# Patient Record
Sex: FEMALE | Race: BLACK OR AFRICAN AMERICAN | NOT HISPANIC OR LATINO | URBAN - METROPOLITAN AREA
[De-identification: names, ages, dates, MRNs, and addresses within clinical notes are randomized per-mention and may not be internally consistent; named-entity substitution may affect disease eponyms.]

---

## 2018-06-04 ENCOUNTER — EMERGENCY (EMERGENCY)
Facility: HOSPITAL | Age: 53
LOS: 1 days | Discharge: HOME | End: 2018-06-04
Admitting: EMERGENCY MEDICINE
Payer: MEDICARE

## 2018-06-04 VITALS
TEMPERATURE: 98 F | RESPIRATION RATE: 18 BRPM | OXYGEN SATURATION: 100 % | SYSTOLIC BLOOD PRESSURE: 164 MMHG | HEART RATE: 85 BPM | DIASTOLIC BLOOD PRESSURE: 96 MMHG

## 2018-06-04 VITALS
WEIGHT: 166.89 LBS | HEART RATE: 75 BPM | RESPIRATION RATE: 18 BRPM | TEMPERATURE: 98 F | OXYGEN SATURATION: 99 % | SYSTOLIC BLOOD PRESSURE: 163 MMHG | DIASTOLIC BLOOD PRESSURE: 75 MMHG

## 2018-06-04 PROCEDURE — 99284 EMERGENCY DEPT VISIT MOD MDM: CPT | Mod: 25

## 2018-06-04 PROCEDURE — 93971 EXTREMITY STUDY: CPT | Mod: 26,RT

## 2018-06-04 PROCEDURE — 93971 EXTREMITY STUDY: CPT

## 2018-06-04 RX ORDER — IBUPROFEN 200 MG
600 TABLET ORAL ONCE
Qty: 0 | Refills: 0 | Status: COMPLETED | OUTPATIENT
Start: 2018-06-04 | End: 2018-06-04

## 2018-06-04 RX ADMIN — Medication 600 MILLIGRAM(S): at 07:56

## 2018-06-04 NOTE — ED PROVIDER NOTE - OBJECTIVE STATEMENT
The pt is a 53 y/o F, who presents to ED c/o "lump" to R leg x while but has been bothering her over past wk. Pt states that has been apply hot compresses and feels like symptoms getting worse, has not taken any pain meds, pain is 4/10, aggravated w/certain positions. Denies injury, fall, numbness or tingling to toes, calf pain or swelling, fevers, chills, cp, sob

## 2018-06-04 NOTE — ED PROVIDER NOTE - MEDICAL DECISION MAKING DETAILS
pt w/"lump" to r knee x while, here stating that it's causing discomfort, us done to eval bakers cyst vs dvt but + lipoma - pt advised to f/u w/ortho or plastics for removal if desires, pt understands and agrees w/plan

## 2018-06-04 NOTE — ED ADULT NURSE NOTE - OBJECTIVE STATEMENT
53 y/o female with hx of DM, HTN, anxiety and depression arrived to [facility] ER reporting right thigh and knee pain accompanied with swelling. Upon assessment, swelling noted to posterior right thigh and knee that is painful to touch, abdomen soft, lung fields WNL, breathing is equal and unlabored, pulses palpable. pt denies chest pain, headache, dizziness, blurred vision, slurred speech, nausea, vomiting, diarrhea, fever, chills, LOC, SOB, weakness, fatigue, recent injury and palpitations. pt verbalized that she first noticed symptoms 2 weeks ago. Care in progress. Awaiting disposition.

## 2018-06-04 NOTE — ED ADULT TRIAGE NOTE - CHIEF COMPLAINT QUOTE
I have a lump on the back side of my right leg that I first noticed 2 weeks ago.  It seems to be getting bigger and bigger and is causing me pain.

## 2018-06-04 NOTE — ED PROVIDER NOTE - MUSCULOSKELETAL, MLM
R LE:  a small palpable ? cyst to popliteal area, no erythema, no warmth, no abscess, FROM, no calf tend, no palpable cords, no rash, soft compartments, + light touch, pedal pulse 2+ R LE:  a small palpable ? cyst vs lipoma to popliteal area, no erythema, no warmth, no abscess, FROM, no calf tend, no palpable cords, no rash, soft compartments, + light touch, pedal pulse 2+; also a 6 cm lipoma to r mid posterior thigh

## 2018-06-08 DIAGNOSIS — Z88.5 ALLERGY STATUS TO NARCOTIC AGENT: ICD-10-CM

## 2018-06-08 DIAGNOSIS — E11.9 TYPE 2 DIABETES MELLITUS WITHOUT COMPLICATIONS: ICD-10-CM

## 2018-06-08 DIAGNOSIS — I10 ESSENTIAL (PRIMARY) HYPERTENSION: ICD-10-CM

## 2018-06-08 DIAGNOSIS — D17.23 BENIGN LIPOMATOUS NEOPLASM OF SKIN AND SUBCUTANEOUS TISSUE OF RIGHT LEG: ICD-10-CM

## 2018-06-08 DIAGNOSIS — M79.661 PAIN IN RIGHT LOWER LEG: ICD-10-CM

## 2018-07-13 ENCOUNTER — EMERGENCY (EMERGENCY)
Facility: HOSPITAL | Age: 53
LOS: 1 days | Discharge: HOME | End: 2018-07-13
Admitting: EMERGENCY MEDICINE
Payer: MEDICARE

## 2018-07-13 VITALS
SYSTOLIC BLOOD PRESSURE: 134 MMHG | TEMPERATURE: 98 F | HEART RATE: 64 BPM | HEIGHT: 65 IN | OXYGEN SATURATION: 98 % | RESPIRATION RATE: 16 BRPM | DIASTOLIC BLOOD PRESSURE: 80 MMHG | WEIGHT: 160.06 LBS

## 2018-07-13 VITALS — WEIGHT: 160.06 LBS | HEIGHT: 65 IN

## 2018-07-13 PROCEDURE — 99283 EMERGENCY DEPT VISIT LOW MDM: CPT | Mod: 25

## 2018-07-13 PROCEDURE — 99282 EMERGENCY DEPT VISIT SF MDM: CPT

## 2018-07-13 RX ORDER — ALBUTEROL 90 UG/1
2 AEROSOL, METERED ORAL
Qty: 0 | Refills: 0 | COMMUNITY

## 2018-07-13 RX ORDER — MELOXICAM 15 MG/1
1 TABLET ORAL
Qty: 0 | Refills: 0 | COMMUNITY

## 2018-07-13 NOTE — ED PROVIDER NOTE - PHYSICAL EXAMINATION
LLE with independent ability to SLR + reflexes intact achilles and patella + compartments soft MS 5/5 Quads/ TA/Hamstrings and EHL + gait steady with cane skin intact pulses 2+ cap refill brisk skin warm and pink + ROM at hip reveals tenderness at extremes ( RLE with known lipoma's x 2 noted as reported at thigh and knee

## 2018-07-13 NOTE — ED ADULT NURSE NOTE - PMH
Anxiety    Asthma    Depression    DM (diabetes mellitus)    Hip pain, chronic, left    HTN (hypertension)

## 2018-07-13 NOTE — ED PROVIDER NOTE - OBJECTIVE STATEMENT
Left hip pain and anterior thigh numbness all week as acute exacerbation of chronic left hip djd pain sans any acute trauma. Reports aching pain and anterior thigh numbness this week and concerned and thus to ED. Patient sees pain management MD as wello as Ortho + told needs Total Hi[p Arthroplasty (ANALI ) though has not proceeded as yet. States last MRI ~ 6 months ago  and believes was both LS spine and hips but unclear + ambulates with cane at baseline > 3 years ( no active bilateral pathology no b/b pathology )

## 2018-07-13 NOTE — ED PROVIDER NOTE - MEDICAL DECISION MAKING DETAILS
anxious female with protracted course at left hip with concerns over intervention + anterior thigh numbness reported is a confounder as to whether lumbar disc pathology as well at play -> discussed follow up with both ortho and pain management MD to review studies completed and patholgies noted and whether or not new study warranted -> given current exam and chronic nature of reported pathology no acute study or intervention warranted

## 2018-07-13 NOTE — ED ADULT NURSE NOTE - OBJECTIVE STATEMENT
Pt states " I have chronic left hip pain. I hurt it in 2013. Tonight it is hurting more than usual".

## 2018-07-13 NOTE — ED ADULT NURSE NOTE - CHPI ED SYMPTOMS NEG
no deformity/no abrasion/no tingling/no bruising/no difficulty bearing weight/no stiffness/no fever/no back pain

## 2018-07-17 DIAGNOSIS — Z88.5 ALLERGY STATUS TO NARCOTIC AGENT: ICD-10-CM

## 2018-07-17 DIAGNOSIS — M25.552 PAIN IN LEFT HIP: ICD-10-CM

## 2018-07-17 DIAGNOSIS — I10 ESSENTIAL (PRIMARY) HYPERTENSION: ICD-10-CM

## 2018-07-17 DIAGNOSIS — R26.2 DIFFICULTY IN WALKING, NOT ELSEWHERE CLASSIFIED: ICD-10-CM

## 2018-07-17 DIAGNOSIS — E11.9 TYPE 2 DIABETES MELLITUS WITHOUT COMPLICATIONS: ICD-10-CM

## 2018-07-17 DIAGNOSIS — J45.909 UNSPECIFIED ASTHMA, UNCOMPLICATED: ICD-10-CM

## 2018-07-17 DIAGNOSIS — Z79.899 OTHER LONG TERM (CURRENT) DRUG THERAPY: ICD-10-CM

## 2019-01-11 ENCOUNTER — EMERGENCY (EMERGENCY)
Facility: HOSPITAL | Age: 54
LOS: 1 days | Discharge: HOME | End: 2019-01-11
Attending: EMERGENCY MEDICINE | Admitting: EMERGENCY MEDICINE
Payer: MEDICARE

## 2019-01-11 VITALS
TEMPERATURE: 98 F | WEIGHT: 154.1 LBS | SYSTOLIC BLOOD PRESSURE: 167 MMHG | OXYGEN SATURATION: 100 % | HEART RATE: 69 BPM | RESPIRATION RATE: 18 BRPM | DIASTOLIC BLOOD PRESSURE: 66 MMHG

## 2019-01-11 DIAGNOSIS — E11.9 TYPE 2 DIABETES MELLITUS WITHOUT COMPLICATIONS: ICD-10-CM

## 2019-01-11 DIAGNOSIS — Z79.899 OTHER LONG TERM (CURRENT) DRUG THERAPY: ICD-10-CM

## 2019-01-11 DIAGNOSIS — J45.909 UNSPECIFIED ASTHMA, UNCOMPLICATED: ICD-10-CM

## 2019-01-11 DIAGNOSIS — I10 ESSENTIAL (PRIMARY) HYPERTENSION: ICD-10-CM

## 2019-01-11 DIAGNOSIS — Z88.5 ALLERGY STATUS TO NARCOTIC AGENT: ICD-10-CM

## 2019-01-11 DIAGNOSIS — R31.0 GROSS HEMATURIA: ICD-10-CM

## 2019-01-11 DIAGNOSIS — N30.01 ACUTE CYSTITIS WITH HEMATURIA: ICD-10-CM

## 2019-01-11 DIAGNOSIS — Z79.1 LONG TERM (CURRENT) USE OF NON-STEROIDAL ANTI-INFLAMMATORIES (NSAID): ICD-10-CM

## 2019-01-11 PROBLEM — F32.9 MAJOR DEPRESSIVE DISORDER, SINGLE EPISODE, UNSPECIFIED: Chronic | Status: ACTIVE | Noted: 2018-07-13

## 2019-01-11 PROBLEM — F41.9 ANXIETY DISORDER, UNSPECIFIED: Chronic | Status: ACTIVE | Noted: 2018-06-04

## 2019-01-11 PROBLEM — M25.552 PAIN IN LEFT HIP: Chronic | Status: ACTIVE | Noted: 2018-07-13

## 2019-01-11 LAB
ALBUMIN SERPL ELPH-MCNC: 4.4 G/DL — SIGNIFICANT CHANGE UP (ref 3.3–5)
ALP SERPL-CCNC: 70 U/L — SIGNIFICANT CHANGE UP (ref 40–120)
ALT FLD-CCNC: 22 U/L — SIGNIFICANT CHANGE UP (ref 10–45)
ANION GAP SERPL CALC-SCNC: 12 MMOL/L — SIGNIFICANT CHANGE UP (ref 5–17)
APPEARANCE UR: ABNORMAL
APTT BLD: 34.6 SEC — SIGNIFICANT CHANGE UP (ref 27.5–36.3)
AST SERPL-CCNC: 17 U/L — SIGNIFICANT CHANGE UP (ref 10–40)
BASOPHILS NFR BLD AUTO: 0.3 % — SIGNIFICANT CHANGE UP (ref 0–2)
BILIRUB SERPL-MCNC: 0.5 MG/DL — SIGNIFICANT CHANGE UP (ref 0.2–1.2)
BILIRUB UR-MCNC: ABNORMAL
BUN SERPL-MCNC: 15 MG/DL — SIGNIFICANT CHANGE UP (ref 7–23)
CALCIUM SERPL-MCNC: 9.5 MG/DL — SIGNIFICANT CHANGE UP (ref 8.4–10.5)
CHLORIDE SERPL-SCNC: 103 MMOL/L — SIGNIFICANT CHANGE UP (ref 96–108)
CO2 SERPL-SCNC: 28 MMOL/L — SIGNIFICANT CHANGE UP (ref 22–31)
COLOR SPEC: ABNORMAL
CREAT SERPL-MCNC: 0.95 MG/DL — SIGNIFICANT CHANGE UP (ref 0.5–1.3)
DIFF PNL FLD: ABNORMAL
EOSINOPHIL NFR BLD AUTO: 3.9 % — SIGNIFICANT CHANGE UP (ref 0–6)
GLUCOSE SERPL-MCNC: 87 MG/DL — SIGNIFICANT CHANGE UP (ref 70–99)
GLUCOSE UR QL: NEGATIVE — SIGNIFICANT CHANGE UP
HCT VFR BLD CALC: 38.7 % — SIGNIFICANT CHANGE UP (ref 34.5–45)
HGB BLD-MCNC: 13 G/DL — SIGNIFICANT CHANGE UP (ref 11.5–15.5)
INR BLD: 1.12 — SIGNIFICANT CHANGE UP (ref 0.88–1.16)
KETONES UR-MCNC: ABNORMAL MG/DL
LEUKOCYTE ESTERASE UR-ACNC: ABNORMAL
LYMPHOCYTES # BLD AUTO: 32.7 % — SIGNIFICANT CHANGE UP (ref 13–44)
MCHC RBC-ENTMCNC: 31 PG — SIGNIFICANT CHANGE UP (ref 27–34)
MCHC RBC-ENTMCNC: 33.6 G/DL — SIGNIFICANT CHANGE UP (ref 32–36)
MCV RBC AUTO: 92.4 FL — SIGNIFICANT CHANGE UP (ref 80–100)
MONOCYTES NFR BLD AUTO: 6.3 % — SIGNIFICANT CHANGE UP (ref 2–14)
NEUTROPHILS NFR BLD AUTO: 56.8 % — SIGNIFICANT CHANGE UP (ref 43–77)
NITRITE UR-MCNC: POSITIVE
PH UR: 6.5 — SIGNIFICANT CHANGE UP (ref 5–8)
PLATELET # BLD AUTO: 301 K/UL — SIGNIFICANT CHANGE UP (ref 150–400)
POTASSIUM SERPL-MCNC: 4 MMOL/L — SIGNIFICANT CHANGE UP (ref 3.5–5.3)
POTASSIUM SERPL-SCNC: 4 MMOL/L — SIGNIFICANT CHANGE UP (ref 3.5–5.3)
PROT SERPL-MCNC: 7.6 G/DL — SIGNIFICANT CHANGE UP (ref 6–8.3)
PROT UR-MCNC: >=300 MG/DL
PROTHROM AB SERPL-ACNC: 12.7 SEC — SIGNIFICANT CHANGE UP (ref 10–12.9)
RBC # BLD: 4.19 M/UL — SIGNIFICANT CHANGE UP (ref 3.8–5.2)
RBC # FLD: 13.2 % — SIGNIFICANT CHANGE UP (ref 10.3–16.9)
SODIUM SERPL-SCNC: 143 MMOL/L — SIGNIFICANT CHANGE UP (ref 135–145)
SP GR SPEC: >=1.03 — SIGNIFICANT CHANGE UP (ref 1–1.03)
UROBILINOGEN FLD QL: 1 E.U./DL — SIGNIFICANT CHANGE UP
WBC # BLD: 7.2 K/UL — SIGNIFICANT CHANGE UP (ref 3.8–10.5)
WBC # FLD AUTO: 7.2 K/UL — SIGNIFICANT CHANGE UP (ref 3.8–10.5)

## 2019-01-11 PROCEDURE — 99284 EMERGENCY DEPT VISIT MOD MDM: CPT

## 2019-01-11 PROCEDURE — 81001 URINALYSIS AUTO W/SCOPE: CPT

## 2019-01-11 PROCEDURE — 85730 THROMBOPLASTIN TIME PARTIAL: CPT

## 2019-01-11 PROCEDURE — 85610 PROTHROMBIN TIME: CPT

## 2019-01-11 PROCEDURE — 80053 COMPREHEN METABOLIC PANEL: CPT

## 2019-01-11 PROCEDURE — 96374 THER/PROPH/DIAG INJ IV PUSH: CPT

## 2019-01-11 PROCEDURE — 85025 COMPLETE CBC W/AUTO DIFF WBC: CPT

## 2019-01-11 PROCEDURE — 99284 EMERGENCY DEPT VISIT MOD MDM: CPT | Mod: 25

## 2019-01-11 PROCEDURE — 36415 COLL VENOUS BLD VENIPUNCTURE: CPT

## 2019-01-11 PROCEDURE — 87086 URINE CULTURE/COLONY COUNT: CPT

## 2019-01-11 RX ORDER — SODIUM CHLORIDE 9 MG/ML
3 INJECTION INTRAMUSCULAR; INTRAVENOUS; SUBCUTANEOUS ONCE
Qty: 0 | Refills: 0 | Status: COMPLETED | OUTPATIENT
Start: 2019-01-11 | End: 2019-01-11

## 2019-01-11 RX ORDER — KETOROLAC TROMETHAMINE 30 MG/ML
30 SYRINGE (ML) INJECTION ONCE
Qty: 0 | Refills: 0 | Status: DISCONTINUED | OUTPATIENT
Start: 2019-01-11 | End: 2019-01-11

## 2019-01-11 RX ORDER — NITROFURANTOIN MACROCRYSTAL 50 MG
1 CAPSULE ORAL
Qty: 14 | Refills: 0 | OUTPATIENT
Start: 2019-01-11 | End: 2019-01-17

## 2019-01-11 RX ORDER — SODIUM CHLORIDE 9 MG/ML
1000 INJECTION INTRAMUSCULAR; INTRAVENOUS; SUBCUTANEOUS ONCE
Qty: 0 | Refills: 0 | Status: COMPLETED | OUTPATIENT
Start: 2019-01-11 | End: 2019-01-11

## 2019-01-11 RX ADMIN — Medication 30 MILLIGRAM(S): at 17:38

## 2019-01-11 RX ADMIN — SODIUM CHLORIDE 1000 MILLILITER(S): 9 INJECTION INTRAMUSCULAR; INTRAVENOUS; SUBCUTANEOUS at 16:25

## 2019-01-11 RX ADMIN — SODIUM CHLORIDE 3 MILLILITER(S): 9 INJECTION INTRAMUSCULAR; INTRAVENOUS; SUBCUTANEOUS at 16:13

## 2019-01-11 NOTE — ED PROVIDER NOTE - OBJECTIVE STATEMENT
The pt is a 54 y/o F, who presents to ED c/o bloody urine since yest. Pt states that she used a deuche and noticed bloody urine later that day. Denies flank pain, n/v/d, fevers, chills, vag bleeding, vag dc, dysuria, urgency

## 2019-01-11 NOTE — ED PROVIDER NOTE - ATTENDING CONTRIBUTION TO CARE
Pt w gross hematuria, no abd pain, f/c, abd pain, flank pain, Well appearing, nad, nc/at, lung cta, heart reg, abd soft, nt, ext no gross deformity, no gross neuro deficits, no cvat, will treat for cystitis, fu w pmd/gyn.

## 2019-01-11 NOTE — ED ADULT NURSE NOTE - NSIMPLEMENTINTERV_GEN_ALL_ED
Implemented All Universal Safety Interventions:  [place] to call system. Call bell, personal items and telephone within reach. Instruct patient to call for assistance. Room bathroom lighting operational. Non-slip footwear when patient is off stretcher. Physically safe environment: no spills, clutter or unnecessary equipment. Stretcher in lowest position, wheels locked, appropriate side rails in place.

## 2019-01-11 NOTE — ED ADULT NURSE NOTE - CHPI ED NUR SYMPTOMS NEG
no coffee grounds emesis/no nausea/no pain/no abdominal pain/no back pain/no fever/no discharge/no vomiting

## 2019-01-11 NOTE — ED PROVIDER NOTE - MEDICAL DECISION MAKING DETAILS
pt w/hematuria x 1 d, no dysuria/no abd or flank pain, normal pelvic exam, labs wnl, will tx w/abx and prn ibuprofen, hydration and f/u w/pmd discussed at length, pt understands and agrees w/plan

## 2019-01-12 RX ORDER — NITROFURANTOIN MACROCRYSTAL 50 MG
1 CAPSULE ORAL
Qty: 14 | Refills: 0 | OUTPATIENT
Start: 2019-01-12 | End: 2019-01-18

## 2019-01-13 LAB
CULTURE RESULTS: NO GROWTH — SIGNIFICANT CHANGE UP
SPECIMEN SOURCE: SIGNIFICANT CHANGE UP

## 2021-07-25 NOTE — ED ADULT TRIAGE NOTE - BP NONINVASIVE DIASTOLIC (MM HG)
[Alert] : alert [Well Nourished] : well nourished [Healthy Appearance] : healthy appearance [Normal Sclera/Conjunctiva] : normal sclera/conjunctiva [EOMI] : extra ocular movement intact [PERRL] : pupils equal, round and reactive to light [No Proptosis] : no proptosis [No Lid Lag] : no lid lag [Normal Outer Ear/Nose] : the ears and nose were normal in appearance [Normal Hearing] : hearing was normal [No Neck Mass] : no neck mass was observed [No LAD] : no lymphadenopathy [Thyroid Not Enlarged] : the thyroid was not enlarged [No Thyroid Nodules] : no palpable thyroid nodules [Clear to Auscultation] : lungs were clear to auscultation bilaterally [No Murmurs] : no murmurs [Normal Rate] : heart rate was normal 75 [Regular Rhythm] : with a regular rhythm [Carotids Normal] : carotid pulses were normal with no bruits [No Edema] : no peripheral edema [Not Tender] : non-tender [Soft] : abdomen soft [No HSM] : no hepato-splenomegaly [Normal Supraclavicular Nodes] : no supraclavicular lymphadenopathy [Normal Anterior Cervical Nodes] : no anterior cervical lymphadenopathy [No CVA Tenderness] : no ~M costovertebral angle tenderness [Normal Gait] : normal gait [No Involuntary Movements] : no involuntary movements were seen [No Joint Swelling] : no joint swelling seen [Normal Strength/Tone] : muscle strength and tone were normal [No Rash] : no rash [No Motor Deficits] : the motor exam was normal [No Tremors] : no tremors [Normal Affect] : the affect was normal [Normal Mood] : the mood was normal [Kyphosis] : no kyphosis present [Acanthosis Nigricans] : no acanthosis nigricans [Foot Ulcers] : no foot ulcers [de-identified] : DP pulses 3+ bilaterally [de-identified] : DTRs 2+ at the ankles with a slightly brisk relaxation phase

## 2022-11-12 ENCOUNTER — EMERGENCY (EMERGENCY)
Facility: HOSPITAL | Age: 57
LOS: 1 days | Discharge: HOME | End: 2022-11-12
Attending: EMERGENCY MEDICINE | Admitting: EMERGENCY MEDICINE
Payer: COMMERCIAL

## 2022-11-12 VITALS
RESPIRATION RATE: 18 BRPM | WEIGHT: 134.92 LBS | HEART RATE: 68 BPM | OXYGEN SATURATION: 99 % | SYSTOLIC BLOOD PRESSURE: 171 MMHG | DIASTOLIC BLOOD PRESSURE: 87 MMHG | TEMPERATURE: 99 F

## 2022-11-12 DIAGNOSIS — Z88.6 ALLERGY STATUS TO ANALGESIC AGENT: ICD-10-CM

## 2022-11-12 DIAGNOSIS — E11.9 TYPE 2 DIABETES MELLITUS WITHOUT COMPLICATIONS: ICD-10-CM

## 2022-11-12 DIAGNOSIS — M54.2 CERVICALGIA: ICD-10-CM

## 2022-11-12 DIAGNOSIS — Z20.2 CONTACT WITH AND (SUSPECTED) EXPOSURE TO INFECTIONS WITH A PREDOMINANTLY SEXUAL MODE OF TRANSMISSION: ICD-10-CM

## 2022-11-12 DIAGNOSIS — N76.0 ACUTE VAGINITIS: ICD-10-CM

## 2022-11-12 DIAGNOSIS — J45.909 UNSPECIFIED ASTHMA, UNCOMPLICATED: ICD-10-CM

## 2022-11-12 DIAGNOSIS — F41.8 OTHER SPECIFIED ANXIETY DISORDERS: ICD-10-CM

## 2022-11-12 DIAGNOSIS — I10 ESSENTIAL (PRIMARY) HYPERTENSION: ICD-10-CM

## 2022-11-12 LAB
APPEARANCE UR: CLEAR — SIGNIFICANT CHANGE UP
BACTERIA # UR AUTO: PRESENT /HPF
BILIRUB UR-MCNC: NEGATIVE — SIGNIFICANT CHANGE UP
COLOR SPEC: YELLOW — SIGNIFICANT CHANGE UP
DIFF PNL FLD: ABNORMAL
EPI CELLS # UR: ABNORMAL /HPF (ref 0–5)
GLUCOSE UR QL: NEGATIVE — SIGNIFICANT CHANGE UP
KETONES UR-MCNC: NEGATIVE — SIGNIFICANT CHANGE UP
LEUKOCYTE ESTERASE UR-ACNC: ABNORMAL
NITRITE UR-MCNC: NEGATIVE — SIGNIFICANT CHANGE UP
PH UR: 5.5 — SIGNIFICANT CHANGE UP (ref 5–8)
PROT UR-MCNC: NEGATIVE MG/DL — SIGNIFICANT CHANGE UP
RBC CASTS # UR COMP ASSIST: < 5 /HPF — SIGNIFICANT CHANGE UP
SP GR SPEC: >=1.03 — SIGNIFICANT CHANGE UP (ref 1–1.03)
UROBILINOGEN FLD QL: 0.2 E.U./DL — SIGNIFICANT CHANGE UP
WBC UR QL: ABNORMAL /HPF

## 2022-11-12 PROCEDURE — 81001 URINALYSIS AUTO W/SCOPE: CPT

## 2022-11-12 PROCEDURE — 87086 URINE CULTURE/COLONY COUNT: CPT

## 2022-11-12 PROCEDURE — 96374 THER/PROPH/DIAG INJ IV PUSH: CPT

## 2022-11-12 PROCEDURE — 87591 N.GONORRHOEAE DNA AMP PROB: CPT

## 2022-11-12 PROCEDURE — 99284 EMERGENCY DEPT VISIT MOD MDM: CPT

## 2022-11-12 PROCEDURE — 87491 CHLMYD TRACH DNA AMP PROBE: CPT

## 2022-11-12 PROCEDURE — 99284 EMERGENCY DEPT VISIT MOD MDM: CPT | Mod: 25

## 2022-11-12 RX ORDER — IBUPROFEN 200 MG
1 TABLET ORAL
Qty: 15 | Refills: 0
Start: 2022-11-12 | End: 2022-11-16

## 2022-11-12 RX ORDER — CYCLOBENZAPRINE HYDROCHLORIDE 10 MG/1
1 TABLET, FILM COATED ORAL
Qty: 30 | Refills: 0
Start: 2022-11-12 | End: 2022-11-21

## 2022-11-12 RX ORDER — ACETAMINOPHEN 500 MG
1000 TABLET ORAL ONCE
Refills: 0 | Status: COMPLETED | OUTPATIENT
Start: 2022-11-12 | End: 2022-11-12

## 2022-11-12 RX ORDER — METRONIDAZOLE 7.5 MG/G
1 GEL VAGINAL
Qty: 7 | Refills: 0
Start: 2022-11-12 | End: 2022-11-18

## 2022-11-12 RX ORDER — LOSARTAN POTASSIUM 100 MG/1
1 TABLET, FILM COATED ORAL
Qty: 0 | Refills: 0 | DISCHARGE

## 2022-11-12 RX ORDER — KETOROLAC TROMETHAMINE 30 MG/ML
15 SYRINGE (ML) INJECTION ONCE
Refills: 0 | Status: DISCONTINUED | OUTPATIENT
Start: 2022-11-12 | End: 2022-11-12

## 2022-11-12 RX ADMIN — Medication 15 MILLIGRAM(S): at 15:50

## 2022-11-12 RX ADMIN — Medication 1000 MILLIGRAM(S): at 15:50

## 2022-11-12 NOTE — ED PROVIDER NOTE - PHYSICAL EXAMINATION
VITAL SIGNS: I have reviewed nursing notes and confirm.  CONSTITUTIONAL: Well-developed; well-nourished; in no acute distress.  SKIN: Agree with RN documentation regarding decubitus evaluation. Remainder of skin exam is warm and dry, no acute rash.  HEAD: Normocephalic; atraumatic.  EYES: PERRL, EOM intact; conjunctiva and sclera clear.  ENT: No nasal discharge; airway clear.  NECK: Supple; non tender.  CARD: S1, S2 normal; no murmurs, gallops, or rubs. Regular rate and rhythm.  RESP: No wheezes, rales or rhonchi.  ABD: Normal bowel sounds; soft; non-distended; non-tender; no hepatosplenomegaly.  EXT: Tender to touch right upper paraspinal. Cervical spine discomfort with stretching of splenius capitatus, greatest point at mid levator scapulae and upper trapezius. Sensations intact.  Dukes and flexion intact discomfort with full extension. Normal ROM. No clubbing, cyanosis or edema.  LYMPH: No acute cervical adenopathy.  NEURO: Alert, oriented. Grossly unremarkable.  PSYCH: Cooperative, appropriate. VITAL SIGNS: I have reviewed nursing notes and confirm.  CONSTITUTIONAL: Well-developed; well-nourished; in no acute distress.  SKIN: Agree with RN documentation regarding decubitus evaluation. Remainder of skin exam is warm and dry, no acute rash.  HEAD: Normocephalic; atraumatic.  EYES: PERRL, EOM intact; conjunctiva and sclera clear.  ENT: No nasal discharge; airway clear.  NECK: , no central spine tenderness , Tender to touch right upper cervical  paraspinal. Cervical spine discomfort with stretching of splenius capitus, greatest point of tenderness at mid splenius and at at mid levator scapulae and upper trapezius. Sensations intact.  extension and flexion intact although discomfort with full extension. slightly limited head turning/ rotation. .   CARD: S1, S2 normal; no murmurs, gallops, or rubs. Regular rate and rhythm.  RESP: No wheezes, rales or rhonchi.  ABD: Normal bowel sounds; soft; non-distended; non-tender; no hepatosplenomegaly.  EXT: No clubbing, cyanosis or edema.  LYMPH: No acute cervical adenopathy.  NEURO: Alert, oriented. Grossly unremarkable.  PSYCH: Cooperative, appropriate.

## 2022-11-12 NOTE — ED PROVIDER NOTE - PATIENT PORTAL LINK FT
You can access the FollowMyHealth Patient Portal offered by [facility] by registering at the following website: [email or web address]. By joining [facility]’s FollowMyHealth portal, you will also be able to view your health information using other applications (apps) compatible with our system.

## 2022-11-12 NOTE — ED PROVIDER NOTE - NS ED ROS FT
56 y/o F with no significant PMHx presents to the ED c/o neck pain and stiffness to right posterior neck beginning 4d ago when she woke up. Pain worsens with movement, pt report stiffness with rotation and pain exacerbated by rotation. Pt denies any numbness, weakness, trauma, or fevers.     Problem 2: Pt c/o 4-5d of vaginal discharge with fish odor. Pt reports no relief with over the counter yeast creams. Pt states its only minimally itchy and that she also had dysuria. Pt denies any frequency, hematuria, or abdominal pain. Pt states she has been in a monogamous relationship and never contracted an STD from her partner, however, reports not trusting him.

## 2022-11-12 NOTE — ED PROVIDER NOTE - NSFOLLOWUPINSTRUCTIONS_ED_ALL_ED_FT
Acute Torticollis, Adult      Torticollis is a condition in which the muscles of the neck tighten (contract) abnormally, causing the neck to twist and the head to move into an unnatural position. Torticollis that develops suddenly is called acute torticollis. People with acute torticollis may have trouble turning their head. The condition can be painful and may range from mild to severe.      What are the causes?    This condition may be caused by:  •Sleeping in an awkward position. This is common.      •Extending or twisting the neck muscles beyond their normal position.      •An injury to the neck muscles.      •An infection.      •A tumor.      •Certain medicines.      •Long-lasting spasms of the neck muscles.      In some cases, the cause may not be known.      What increases the risk?    You are more likely to develop this condition if:  •You have a condition associated with loose ligaments, such as Down syndrome.      •You have a brain condition that affects vision, such as strabismus.        What are the signs or symptoms?    The main symptom of this condition is tilting of the head to one side. Other symptoms include:  •Pain in the neck.      •Trouble turning the head from side to side or up and down.        How is this diagnosed?    This condition may be diagnosed based on:  •A physical exam.      •Your medical history.    •Imaging tests, such as:  •An X-ray.      •An ultrasound.      •A CT scan.      •An MRI.          How is this treated?    Treatment for this condition depends on what is causing the condition. Mild cases may go away without treatment. Treatment for more serious cases may include:  •Medicines or shots to relax the muscles.      •Other medicines, such as antibiotics, to treat the underlying cause.      •Wearing a soft neck collar.      •Physical therapy and stretching exercises to improve movement and strength in your neck.      •Neck massage.      In severe cases, surgery may be needed to repair dislocated or broken bones or to treat nerves in the neck.      Follow these instructions at home:     •Take over-the-counter and prescription medicines only as told by your health care provider.      •Do stretching exercises and massage your neck as told by your health care provider.    •If directed, apply heat to the affected area as often as told by your health care provider. Use the heat source that your health care provider recommends, such as a moist heat pack or a heating pad.  •Place a towel between your skin and the heat source.      •Leave the heat on for 20–30 minutes.      •Remove the heat if your skin turns bright red. This is especially important if you are unable to feel pain, heat, or cold. You have a greater risk of getting burned.        •If you wake up with torticollis after sleeping, check your bed or sleeping area. Look for lumpy pillows or unusual objects. Make sure your bed and sleeping area are comfortable.      •Keep all follow-up visits. This is important.        Contact a health care provider if:    •You have a fever.      •Your symptoms do not improve or they get worse.        Get help right away if:    •You have trouble breathing.      •You make loud, high-pitched sounds when you breathe, most often when you breathe in (stridor).      •You start to drool.      •You have trouble swallowing or pain when swallowing.      •You develop numbness or weakness in your hands or feet.      •You have changes in your speech, understanding, or vision.      •You are in severe pain.      •You cannot move your head or neck.      These symptoms may represent a serious problem that is an emergency. Do not wait to see if the symptoms will go away. Get medical help right away. Call your local emergency services (911 in the [place] Do not drive yourself to the hospital.       Summary    •Torticollis is a condition in which the muscles of the neck tighten (contract) abnormally, causing the neck to twist and the head to move into an unnatural position. Torticollis that develops suddenly is called acute torticollis.      •Treatment for this condition depends on what is causing the condition. Mild cases may go away without treatment.      •Do stretching exercises and massage your neck as told by your health care provider. You may also be instructed to apply heat to the area.      •Contact your health care provider if your symptoms do not improve or they get worse.      This information is not intended to replace advice given to you by your health care provider. Make sure you discuss any questions you have with your health care provider.        Bacterial Vaginosis       Bacterial vaginosis is an infection that occurs when the normal balance of bacteria in the vagina changes. This change is caused by an overgrowth of certain bacteria in the vagina. Bacterial vaginosis is the most common vaginal infection among females aged 15 to 44 years.    This condition increases the risk of sexually transmitted infections (STIs). Treatment can help reduce this risk. Treatment is very important for pregnant women because this condition can cause babies to be born early (prematurely) or at a low birth weight.      What are the causes?    This condition is caused by an increase in harmful bacteria that are normally present in small amounts in the vagina. However, the exact reason this condition develops is not known.    You cannot get bacterial vaginosis from toilet seats, bedding, swimming pools, or contact with objects around you.      What increases the risk?    The following factors may make you more likely to develop this condition:  •Having a new sexual partner or multiple sexual partners, or having unprotected sex.      •Douching.      •Having an intrauterine device (IUD).      •Smoking.      •Abusing drugs and alcohol. This may lead to riskier sexual behavior.      •Taking certain antibiotic medicines.      •Being pregnant.        What are the signs or symptoms?    Some women with this condition have no symptoms. Symptoms may include:  •Gray or white vaginal discharge. The discharge can be watery or foamy.      •A fish-like odor with discharge, especially after sex or during menstruation.      •Itching in and around the vagina.      •Burning or pain with urination.        How is this diagnosed?    This condition is diagnosed based on:  •Your medical history.      •A physical exam of the vagina.      •Checking a sample of vaginal fluid for harmful bacteria or abnormal cells.        How is this treated?    This condition is treated with antibiotic medicines. These may be given as a pill, a vaginal cream, or a medicine that is put into the vagina (suppository). If the condition comes back after treatment, a second round of antibiotics may be needed.      Follow these instructions at home:    Medicines     •Take or apply over-the-counter and prescription medicines only as told by your health care provider.      •Take or apply your antibiotic medicine as told by your health care provider. Do not stop using the antibiotic even if you start to feel better.      General instructions     •If you have a female sexual partner, tell her that you have a vaginal infection. She should follow up with her health care provider. If you have a male sexual partner, he does not need treatment.      •Avoid sexual activity until you finish treatment.      •Drink enough fluid to keep your urine pale yellow.    •Keep the area around your vagina and rectum clean.  •Wash the area daily with warm water.      •Wipe yourself from front to back after using the toilet.        •If you are breastfeeding, talk to your health care provider about continuing breastfeeding during treatment.      •Keep all follow-up visits. This is important.        How is this prevented?    Self-care     • Do not douche.      •Wash the outside of your vagina with warm water only.      •Wear cotton or cotton-lined underwear.      •Avoid wearing tight pants and pantyhose, especially during the summer.      Safe sex   •Use protection when having sex. This includes:  •Using condoms.      •Using dental dams. This is a thin layer of a material made of latex or polyurethane that protects the mouth during oral sex.        •Limit the number of sexual partners. To help prevent bacterial vaginosis, it is best to have sex with just one partner (monogamous relationship).      •Make sure you and your sexual partner are tested for STIs.      Drugs and alcohol     • Do not use any products that contain nicotine or tobacco. These products include cigarettes, chewing tobacco, and vaping devices, such as e-cigarettes. If you need help quitting, ask your health care provider.      • Do not use drugs.    • Do not drink alcohol if:  •Your health care provider tells you not to do this.      •You are pregnant, may be pregnant, or are planning to become pregnant.      •If you drink alcohol:  • Limit how much you have to 0–1 drink a day.       •Be aware of how much alcohol is in your drink. In the [place] one drink equals one 12 oz bottle of beer (355 mL), one 5 oz glass of wine (148 mL), or one 1½ oz glass of hard liquor (44 mL).          Where to find more information    •Centers for Disease Control and Prevention: www.cdc.gov      •American Sexual Health Association (TOMASZ): www.ashastd.org      •[place] Department of Health and Human Services, Office on Women's Health: www.womenshealth.gov        Contact a health care provider if:    •Your symptoms do not improve, even after treatment.      •You have more discharge or pain when urinating.      •You have a fever or chills.      •You have pain in your abdomen or pelvis.      •You have pain during sex.      •You have vaginal bleeding between menstrual periods.        Summary    •Bacterial vaginosis is a vaginal infection that occurs when the normal balance of bacteria in the vagina changes. It results from an overgrowth of certain bacteria.      •This condition increases the risk of sexually transmitted infections (STIs). Getting treated can help reduce this risk.      •Treatment is very important for pregnant women because this condition can cause babies to be born early (prematurely) or at low birth weight.      •This condition is treated with antibiotic medicines. These may be given as a pill, a vaginal cream, or a medicine that is put into the vagina (suppository).      This information is not intended to replace advice given to you by your health care provider. Make sure you discuss any questions you have with your health care provider.      Document Revised: 06/17/2021 Document Reviewed: 06/17/2021    Elsevier Patient Education © 2022 [employer]

## 2022-11-12 NOTE — ED ADULT TRIAGE NOTE - CHIEF COMPLAINT QUOTE
Patient c/o right sided neck pain radiating to top of shoulder that she noticed after waking up 4 days ago. Denies falls / trauma, sob, chest pain, head injury

## 2022-11-12 NOTE — ED PROVIDER NOTE - CLINICAL SUMMARY MEDICAL DECISION MAKING FREE TEXT BOX
Problem 1: 56 y/o F with neck pain consistent with torticollis. No neural deficit's or red flags, signs, or symptoms. Pt advised soft tissue therapy and discharged with recommendation of home therapy with ice, heat, and massage.     Problem 2: 56 y/o F with 5d of discharge, vaginal odor, and dysuria most likely bacterial vaginosis. Plan to give Metrogel, send test for gc chlamydia. Pt uninterested in empiric treatment. Plan to send UA to r/o UTI.  Discharge with Metrogel and ABx, if UA positive advised to take probiotics.

## 2022-11-12 NOTE — ED ADULT NURSE NOTE - NSICDXPASTMEDICALHX_GEN_ALL_CORE_FT
PAST MEDICAL HISTORY:  Anxiety     Asthma     Depression     DM (diabetes mellitus)     Hip pain, chronic, left     HTN (hypertension)

## 2022-11-12 NOTE — ED ADULT NURSE NOTE - OBJECTIVE STATEMENT
patient arrived to ED c/o right sided posterior head pain radiating to right shoulder x 4 days. per patient pain is constant, accompanied by a headache, which is intermittent. denies fever, cough, shortness of breath, nausea or vomiting. denies blurry vision or vision impairment.

## 2022-11-14 LAB
C TRACH RRNA SPEC QL NAA+PROBE: SIGNIFICANT CHANGE UP
CULTURE RESULTS: SIGNIFICANT CHANGE UP
N GONORRHOEA RRNA SPEC QL NAA+PROBE: SIGNIFICANT CHANGE UP
SPECIMEN SOURCE: SIGNIFICANT CHANGE UP
SPECIMEN SOURCE: SIGNIFICANT CHANGE UP

## 2024-01-26 NOTE — ED PROVIDER NOTE - CROS ED ROS STATEMENT
Preventive Care Visit  [facility]TEGR[facility] PRIMARY [facility]  Samy Marcelino MD, Family Medicine  2024    SUBJECTIVE:   Tremaine is a 55 year old, presenting for the following:  Physical and Results (A1C, parathyroid)          2024     2:13 PM   Additional Questions   Roomed by Agueda Campo     Healthy Habits:     Getting at least 3 servings of Calcium per day:  Yes    Bi-annual eye exam:  Yes    Dental care twice a year:  Yes    Sleep apnea or symptoms of sleep apnea:  Sleep apnea    Diet:  Diabetic    Frequency of exercise:  2-3 days/week    Duration of exercise:  45-60 minutes    Taking medications regularly:  Yes    Medication side effects:  None    Additional concerns today:  No    Today's PHQ-9 Score:       2024     2:02 PM   PHQ-9 SCORE   PHQ-9 Total Score MyChart 0   PHQ-9 Total Score 0       Social History     Tobacco Use    Smoking status: Former     Types: Cigars     Quit date: 1989     Years since quittin.0    Smokeless tobacco: Never    Tobacco comments:     quit smoking cigarettes and cigars at ~ age 24 (was light smoker for about 4 years))   Substance Use Topics    Alcohol use: Not Currently     Comment: since ulcer 2024     9:17 AM   Alcohol Use   Prescreen: >3 drinks/day or >7 drinks/week? Not Applicable          No data to display                Last PSA:   PSA   Date Value Ref Range Status   2020 2.35 0 - 4 ug/L Final     Comment:     Assay Method:  Chemiluminescence using Siemens Vista analyzer     Prostate Specific Antigen Screen   Date Value Ref Range Status   2024 1.71 0.00 - 3.50 ng/mL Final       Reviewed orders with patient. Reviewed health maintenance and updated orders accordingly - Yes  Labs reviewed in EPIC  BP Readings from Last 3 Encounters:   24 131/77   22 124/74   22 112/76    Wt Readings from Last 3 Encounters:   24 112 kg (247 lb)   23 108.9 kg (240 lb)   23 108.9 kg  (240 lb)                  Patient Active Problem List   Diagnosis    History of repair of mitral valve    CARDIOVASCULAR SCREENING; LDL GOAL LESS THAN 130    Essential hypertension with goal blood pressure less than 140/90    Idiopathic gout, unspecified chronicity, unspecified site    BMI 36.0-36.9,adult    KOTA (obstructive sleep apnea)    Morbid obesity (H)    Atrial fibrillation, unspecified type (H)    Mild persistent asthma without complication    Family history of diabetes mellitus    Localized edema    Type 2 diabetes mellitus without complication, with long-term current use of insulin (H)     Past Surgical History:   Procedure Laterality Date    COLONOSCOPY N/A 2022    Procedure: COLONOSCOPY, FLEXIBLE, WITH LESION REMOVAL USING SNARE;  Surgeon: Tremaine Bernard MD;  Location:  GI    ESOPHAGOSCOPY, GASTROSCOPY, DUODENOSCOPY (EGD), COMBINED N/A 2022    Procedure: ESOPHAGOGASTRODUODENOSCOPY, WITH BIOPSY;  Surgeon: Tremaine Bernard MD;  Location:  GI    ORTHOPEDIC SURGERY      arthroscopy bilat knee, torn meniscus    REPAIR ATRIAL SEPTAL DEFECT  AGE 5    REPAIR VALVE MITRAL  AGE  AND 2005    annuloplasty ring and MVR in 2005    REPAIR VALVE MITRAL         Social History     Tobacco Use    Smoking status: Former     Types: Cigars     Quit date: 1989     Years since quittin.0    Smokeless tobacco: Never    Tobacco comments:     quit smoking cigarettes and cigars at ~ age 24 (was light smoker for about 4 years))   Substance Use Topics    Alcohol use: Not Currently     Comment: since ulcer 2021     Family History   Problem Relation Age of Onset    Diabetes Mother         onset past age 70         Current Outpatient Medications   Medication Sig Dispense Refill    albuterol (PROAIR HFA/PROVENTIL HFA/VENTOLIN HFA) 108 (90 Base) MCG/ACT inhaler Inhale 2 puffs into the lungs every 6 hours as needed for shortness of breath / dyspnea 8.5 g 0    allopurinol (ZYLOPRIM) 300 MG tablet Take  1 tablet (300 mg) by mouth daily 90 tablet 3    amoxicillin (AMOXIL) 500 MG capsule Please take a total of 2 grams (four 500 mg capsules) 30-60 minutes prior to dental procedure. 12 capsule 3    apixaban ANTICOAGULANT (ELIQUIS) 2.5 MG tablet Take 1 tablet (2.5 mg) by mouth 2 times daily 180 tablet 1    budesonide-formoterol (SYMBICORT) 80-4.5 MCG/ACT Inhaler Inhale 2 puffs into the lungs 2 times daily 6.9 g 11    Continuous Blood Gluc Sensor (FREESTYLE MINGO 3 SENSOR) MISC use 1 each every 14 days 6 each 3    diltiazem ER (DILT-XR) 180 MG 24 hr capsule Take 1 capsule (180 mg) by mouth daily 90 capsule 3    furosemide (LASIX) 20 MG tablet Take 1 tablet (20 mg) by mouth daily 90 tablet 3    Insulin Glargine-yfgn (SEMGLEE, YFGN,) 100 UNIT/ML SOPN Inject 16 Units Subcutaneous at bedtime May increase up to 16 units daily as indicated by numbers 15 mL 0    insulin pen needle (BD PEN NEEDLE ZORAIDA 2ND GEN) 32G X 4 MM miscellaneous Use 1 pen needles daily as directed. 100 each 3    metFORMIN (GLUCOPHAGE) 500 MG tablet Take 1 tablet (500 mg) by mouth 2 times daily (with meals) 180 tablet 3    metoprolol succinate ER (TOPROL XL) 100 MG 24 hr tablet Take 1 tablet (100 mg) by mouth daily 90 tablet 3    Multiple Vitamins-Minerals (MENS 50+ MULTI VITAMIN/MIN PO) Take 1 tablet by mouth daily      order for DME Equipment being ordered: CPAP  Patient Tremaine King was set up at [facility] on September 14, 2015. Patient received a [employer] DreamStation Auto CPAP Auto. Pressures were set at Auto 12 - 12 cm H2O.   Patient s ramp is 9 cm H2O for Auto and FLEX/EPR is A Flex.  Patient received a [employer] Mask name: BLUE GEL NASAL  Nasal mask Size Medium, heated tubing and heated humidifier.  Patient is not enrolled in the STM Program and does not need to meet compliance. Samina Arora      sildenafil (REVATIO) 20 MG tablet Take 1 tablet (20 mg) by mouth daily as needed (erectile dysfunction) 12 tablet 1     Allergies    Allergen Reactions    Hctz [Hydrochlorothiazide]      Hypokalemia      Spironolactone      hyponatremia     Recent Labs   Lab Test 01/26/24  1608 11/27/23  0807 04/18/23  0930 04/15/23  1056 12/05/22  1059 02/22/22  0852 11/19/21  1500 11/12/21  0633 10/06/21  0907 11/09/20  1131 07/01/20  0806   A1C 6.8* 7.4*  --  6.8*   < >  --   --   --    < >  --   --    *  --  90  --   --  100 104*  --    < > 114*  --    HDL 38*  --  44  --   --  45 49  --    < > 51  --    TRIG 149  --  101  --   --  130 72  --    < > 180*  --    ALT  --   --   --   --   --  41 101* 91*   < > 166*  --    CR  --  0.95 1.16  --    < > 1.07 1.02 1.08   < > 1.15 1.26*   GFRESTIMATED  --  >90 75  --    < > 83 84 78   < > 73 65   GFRESTBLACK  --   --   --   --   --   --   --   --   --  84 76   POTASSIUM  --  3.9 4.3  --    < > 4.0 3.7 3.7   < > 4.3 4.2   TSH 1.69  --  1.51  --   --   --   --   --   --   --   --     < > = values in this interval not displayed.        Reviewed and updated as needed this visit by clinical staff   Tobacco  Allergies  Meds              Reviewed and updated as needed this visit by Provider     Meds                Review of Systems   Constitutional:  Negative for chills and fever.   HENT:  Negative for congestion, ear pain, hearing loss and sore throat.    Eyes:  Negative for pain and visual disturbance.   Respiratory:  Negative for cough and shortness of breath.    Cardiovascular:  Negative for chest pain and palpitations.   Gastrointestinal:  Negative for abdominal pain, constipation, diarrhea and nausea.   Genitourinary:  Negative for dysuria, frequency, genital sores, hematuria, penile discharge and urgency.   Musculoskeletal:  Negative for arthralgias, joint swelling and myalgias.   Skin:  Negative for rash.   Neurological:  Positive for numbness. Negative for dizziness, weakness and headaches.        Intermittent numbness to toes bilaterally   Psychiatric/Behavioral:  The patient is not nervous/anxious.   "    Diabetes:  BG have been generally below 170 per CGM over the past month since re-starting exercise. No low blood sugars. Denies any numbness or tingling in his toes (but sometimes they feel \"asleep\"). No sores or infections. Eye exam annually without retinopathy.Currently doing an 8 week challenge at [facility]-goes 3 times weekly  Notes BG spikes after meals or while at the gym, then drops after  Taking 12 units glargine daily along with 500 mg metformin BID  Discussed possibility of adding GLP1 to help manage diabetes and obesity-patient agreed to discuss further with PharmD at next visit    Asthma-Unable to get Qvar inhaler. Has rarely needed albuterol (triggers with exercise and cold).     HTN, A-fib, and hx of mitral valve repair: Recent echo stable with normal EF. Denies any chest pain, dizziness, lightheadedness, or shortness of breath with exertion. Tolerating exercise well, but does feel it is difficult to raise his heart rate    OBJECTIVE:   /77   Pulse (!) 45   Temp 97.8  F (36.6  C) (Temporal)   Resp 15   Ht 1.811 m (5' 11.3\")   Wt 112 kg (247 lb)   SpO2 98%   BMI 34.16 kg/m     Estimated body mass index is 34.16 kg/m  as calculated from the following:    Height as of this encounter: 1.811 m (5' 11.3\").    Weight as of this encounter: 112 kg (247 lb).  Physical Exam  GENERAL: alert and no distress  EYES: PERRL, EOMI, and slight outward strabismus noted in L eye with accomodation  HENT: ear canals and TM's normal, nose and mouth without ulcers or lesions  NECK: no adenopathy, no asymmetry, masses, or scars  RESP: lungs clear to auscultation - no rales, rhonchi or wheezes  CV: Bradycardic, murmur present per baseline  ABDOMEN: soft, nontender, no hepatosplenomegaly, no masses and bowel sounds normal  MS: no gross musculoskeletal defects noted, no edema  SKIN: no suspicious lesions or rashes  NEURO: Normal strength and tone, mentation intact and speech normal   PSYCH: mentation appears " normal, affect normal/bright  LYMPH: no cervical, supraclavicular adenopathy  Diabetic foot exam: Normal pedal pulses bilaterally. Normal sensation throughout feet bilaterally to monofilament exam. Normal nail thickness. No sores present.  Labs reviewed in Epic  d  1. Encounter for preventative adult health care exam with abnormal findings  - PSA, screen; Future  - PSA, screen    2. Essential hypertension with goal blood pressure less than 140/90  Will decreased metoprolol dose to 100 mg due to bradycardia with recent echo with normal EF. Patient is asymptomatic with normal BP.  - metoprolol succinate ER (TOPROL XL) 100 MG 24 hr tablet; Take 1 tablet (100 mg) by mouth daily  Dispense: 90 tablet; Refill: 3    3. History of repair of mitral valve  Patient follows closely with cardiology. Recent stable echo.    4. Atrial fibrillation, unspecified type (H)  Stable, continues on apixaban. Followed with cardiology.  EYO1ML2-LBTp Score: 2 points, which represents a 2.2% annual risk of major embolic event, without anti-coagulation or an LAAO device.      5. Type 2 diabetes mellitus without complication, with long-term current use of insulin (H)  Stable. Will consult with PharmD for possible addition of GLP1  - Med Therapy Management Referral    6. Mild persistent asthma without complication  Well-controlled.Updated asthma plan.WiIl switch to SMART therapy as Qvar now unavaialble.  - budesonide-formoterol (SYMBICORT) 80-4.5 MCG/ACT Inhaler; Inhale 2 puffs into the lungs 2 times daily  Dispense: 6.9 g; Refill: 11    7. Serum calcium elevated  - Calcium; Future  - Parathyroid Hormone Intact; Future  - TSH with free T4 reflex; Future  - Vitamin D Deficiency; Future  - Calcium  - Parathyroid Hormone Intact  - TSH with free T4 reflex  - Vitamin D Deficiency    8. Need for vaccination    - ZOSTER RECOMBINANT ADJUVANTED (SHINGRIX)  - COVID-19 12+ (2023-24) (PFIZER)    9. Idiopathic gout, unspecified chronicity, unspecified  "site  Refills provided.  - allopurinol (ZYLOPRIM) 300 MG tablet; Take 1 tablet (300 mg) by mouth daily  Dispense: 90 tablet; Refill: 3    10. Type 2 diabetes mellitus with hyperglycemia, with long-term current use of insulin (H)    - Insulin Glargine-yfgn (SEMGLEE, YFGN,) 100 UNIT/ML SOPN; Inject 16 Units Subcutaneous at bedtime May increase up to 16 units daily as indicated by numbers  Dispense: 15 mL; Refill: 0    11. Type 2 diabetes mellitus without complication, without long-term current use of insulin (H)  - metFORMIN (GLUCOPHAGE) 500 MG tablet; Take 1 tablet (500 mg) by mouth 2 times daily (with meals)  Dispense: 180 tablet; Refill: 3    12. Type 2 diabetes mellitus with hyperglycemia, without long-term current use of insulin (H)    - Hemoglobin A1c    13. Dyslipidemia    - Lipid Profile    Counseling       Regular exercise       Healthy diet/nutrition       Alcohol Use       BMI  Estimated body mass index is 34.16 kg/m  as calculated from the following:    Height as of this encounter: 1.811 m (5' 11.3\").    Weight as of this encounter: 112 kg (247 lb).   Weight management plan: Discussed healthy diet and exercise guidelines Patient willing to meet with PharmD to discuss possibility of GLP1 medication for his diabetes and obesity.      He reports that he quit smoking about 35 years ago. His smoking use included cigars. He has never used smokeless tobacco.    Patient Instructions   Will change metoprolol dose from 150 mg to 100 mg daily due to low heart rate. Alert clinic for any palpitations or elevated heart rate, chest pain, or activity intolerance.    Continue insulin and metformin without changes    Will check labs again today for elevated parathyroid hormone, calcium, thyroid, and vitamin D, and prostate cancer screening    Vaccines for shingles and covid given today    Follow-up in 6 months    Note by:Gladis Mchugh NP student    Signed Electronically by: Samy Marcelino MD  Answers submitted by the " patient for this visit:  Patient Health Questionnaire (Submitted on 1/26/2024)  PHQ9 TOTAL SCORE: 0  Annual Preventive Visit (Submitted on 1/19/2024)  Chief Complaint: Annual Exam:  Blood in stool: No  heartburn: No  peripheral edema: No  mood changes: No  Skin sensation changes: No  impotence: No     all other ROS negative except as per HPI

## 2024-05-31 ENCOUNTER — EMERGENCY (EMERGENCY)
Facility: HOSPITAL | Age: 59
LOS: 1 days | Discharge: HOME | End: 2024-05-31
Attending: EMERGENCY MEDICINE | Admitting: EMERGENCY MEDICINE
Payer: MEDICARE

## 2024-05-31 VITALS
TEMPERATURE: 98 F | SYSTOLIC BLOOD PRESSURE: 180 MMHG | HEIGHT: 63 IN | WEIGHT: 177.03 LBS | RESPIRATION RATE: 17 BRPM | HEART RATE: 77 BPM | DIASTOLIC BLOOD PRESSURE: 83 MMHG | OXYGEN SATURATION: 98 %

## 2024-05-31 DIAGNOSIS — N89.8 OTHER SPECIFIED NONINFLAMMATORY DISORDERS OF VAGINA: ICD-10-CM

## 2024-05-31 DIAGNOSIS — I10 ESSENTIAL (PRIMARY) HYPERTENSION: ICD-10-CM

## 2024-05-31 DIAGNOSIS — Z88.5 ALLERGY STATUS TO NARCOTIC AGENT: ICD-10-CM

## 2024-05-31 PROCEDURE — 99283 EMERGENCY DEPT VISIT LOW MDM: CPT

## 2024-05-31 PROCEDURE — 87800 DETECT AGNT MULT DNA DIREC: CPT

## 2024-05-31 RX ORDER — CLONAZEPAM 1 MG
1 TABLET ORAL
Qty: 0 | Refills: 0 | DISCHARGE

## 2024-05-31 RX ORDER — CLOMIPRAMINE HYDROCHLORIDE 50 MG/1
1 CAPSULE ORAL
Qty: 0 | Refills: 0 | DISCHARGE

## 2024-05-31 NOTE — ED ADULT TRIAGE NOTE - RESPIRATORY RATE (BREATHS/MIN)
flonase nasal spray 2 spays each nostril for nasal congestion, post nasal drip, runny nose    Delsym as needed for cough    Allegra or zyrtec for allergies, post nasal drip, runny nose, watery eyes.    cepacol throat lozenges and/or chloraseptic spray for sore throat    mucinex for chest congestion.     Tylenol or ibuprofen for pain or fever.       17

## 2024-05-31 NOTE — ED PROVIDER NOTE - PATIENT PORTAL LINK FT
You can access the FollowMyHealth Patient Portal offered by [facility] by registering at the following website: [email or web address]. By joining [facility]’s FollowMyHealth portal, you will also be able to view your health information using other applications (apps) compatible with our system.

## 2024-05-31 NOTE — ED PROVIDER NOTE - PHYSICAL EXAMINATION
DEREJE rashid chaperone: speculum. pt points to area just external to labia as location of itching. No crepitus, firmness, induration, fluctuance. Skin is normal temp. No erythema/warmth. No obvious skin breaks.   no ulcers/sores. No discharge. No foul odor.   normal exam    no LE edema, normal equal distal pulses, steady unassisted gait.

## 2024-05-31 NOTE — ED ADULT NURSE NOTE - OBJECTIVE STATEMENT
Pt has been having ongoing burning & odor in her vaginal area for a year. Pt has seen several doctors for it, but has not been helped by any prescribed or OTC medications. Pt current pain level from the burning is 8. Pt denies any other symptoms. Burning is not associated with urination. Pt has no discharge and instead says she has a lot of dryness.

## 2024-05-31 NOTE — ED PROVIDER NOTE - NSFOLLOWUPINSTRUCTIONS_ED_ALL_ED_FT
It is unclear what exactly is causing your symptoms! It is important to continue following up with your doctor outside the hospital and to return to ER for: Persistent fever/vomiting, uncontrolled pain, worsening swelling, worsening breathing, worsening lightheaded, spreading redness.    Follow up with your GYN as soon as possible.  Can also call [facility] at [phone].  OR can follow up at [facility]: [phone].  [address]     Can take tylenol 650mg every 6hrs as needed for pain.    Stay well hydrated.    Continue to take medications as prescribed. Your doctor may need to make medication changes if your blood pressure continues to be high.

## 2024-05-31 NOTE — ED ADULT NURSE NOTE - CHIEF COMPLAINT QUOTE
Drug  Lidocaine Viscous HCl 2% solution  Form  [place] [employer] Electronic PA Form (2017 NCPDP)    Key: [id number]  
PA denied  
Pt c/o vaginal burning, odor x 2 weeks. Prescribed Metronidazole cream with minimal relief. PMH HTN, taking medication in triage. Denies HA, cp.

## 2024-05-31 NOTE — ED ADULT TRIAGE NOTE - CHIEF COMPLAINT QUOTE
Pt c/o vaginal burning, odor x 2 weeks. Prescribed Metronidazole cream with minimal relief. PMH HTN, taking medication in triage. Denies HA, cp.

## 2024-05-31 NOTE — ED ADULT NURSE NOTE - NSFALLUNIVINTERV_ED_ALL_ED
Bed/Stretcher in lowest position, wheels locked, appropriate side rails in place/Call bell, personal items and telephone in reach/Instruct patient to call for assistance before getting out of bed/chair/stretcher/Non-slip footwear applied when patient is off stretcher/[place] to call system/Physically safe environment - no spills, clutter or unnecessary equipment/Purposeful proactive rounding/Room/bathroom lighting operational, light cord in reach

## 2024-05-31 NOTE — ED PROVIDER NOTE - OBJECTIVE STATEMENT
58F PMH HTN p/w vaginal odor and burning/itching for the last 2 weeks. Has intermittent similar symptoms for several years and states that she has been to other ER's and GYN and no one is able to figure out why it happens. Has tried metronidazole cream, monistat, phd vaginal moisturizing gel as well as other OTC medications w/o any improvement. Has GYN appt w/i 1-2 weeks. Came to ED today because she just wants her symptoms to go away. No other systemic symptoms. Has been tested for BV/STDs, all negative.   Denies f/c, vaginal discharge, dysuria/frequency, SOB/CP, abd pain/bloating, NVD, rashes.   Menopause 13yrs ago, last sex >1yr ago. No hx STDs.

## 2024-05-31 NOTE — ED ADULT TRIAGE NOTE - GLASGOW COMA SCALE: SCORE, MLM
----- Message from Jerad Veronica sent at 9/7/2022  2:47 PM CDT -----  Type:  Sooner Appointment Request    Caller is requesting a sooner appointment.  Caller declined first available appointment listed below.  Caller will not accept being placed on the waitlist and is requesting a message be sent to doctor.    Name of Caller:  patient  When is the first available appointment?  11/22  Symptoms:  holger webb  Best Call Back Number:  [phone]   Additional Information:       
Patient have an appointment with Dr. Chan 9/9/22  
15

## 2024-05-31 NOTE — ED PROVIDER NOTE - CLINICAL SUMMARY MEDICAL DECISION MAKING FREE TEXT BOX
58F PMH HTN p/w vaginal odor and burning/itching for the last 2 weeks. Has intermittent similar symptoms for several years and states that she has been to other ER's and GYN and no one is able to figure out why it happens. Has tried metronidazole cream, monistat, phd vaginal moisturizing gel as well as other OTC medications w/o any improvement. Has GYN appt w/i 1-2 weeks. Came to ED today because she just wants her symptoms to go away. No other systemic symptoms. 58F PMH HTN p/w vaginal odor and burning/itching for the last 2 weeks. Has intermittent similar symptoms for several years and states that she has been to other ER's and GYN and no one is able to figure out why it happens. Has tried metronidazole cream, monistat, phd vaginal moisturizing gel as well as other OTC medications w/o any improvement. Has GYN appt w/i 1-2 weeks. Came to ED today because she just wants her symptoms to go away. No other systemic symptoms.  Hypertensive, other vitals wnl. Exam as above.   ddx: Unclear etiology. Normal exam. Low suspicion BV. Possibly menopausal/hormonal related. clinically no acute significant pathology.   Asymptomatic HTN.   BV swab sent off. Would hold empiric tx.   Discussed importance of outpt follow up and return precautions. Clinically no indication for further emergent ED workup or hospitalization at this time. Stable for dc, outpt f/u.

## 2024-05-31 NOTE — ED PROVIDER NOTE - IV ALTEPLASE INCLUSION HIDDEN
Case Management Assessment  Initial Evaluation    Date/Time of Evaluation: 8/16/2023 11:28 AM  Assessment Completed by: Clark Peralta    If patient is discharged prior to next notation, then this note serves as note for discharge by case management. Patient Name: Evelia Sloan                   YOB: 1973  Diagnosis: Diverticulitis [K57.92]  Diverticulitis of colon [K57.32]  Lower abdominal pain [R10.30]  Adverse effect of drug, initial encounter Sterling Aguirre                   Date / Time: 8/15/2023  2:48 PM    Patient Admission Status: Inpatient   Readmission Risk (Low < 19, Mod (19-27), High > 27): Readmission Risk Score: 11.5    Current PCP: Becca Madison MD  PCP verified by CM? Yes    Chart Reviewed: Yes      History Provided by: Spouse  Patient Orientation: Alert and Oriented, Person, Other (see comment) (PT IS Monegasque SPEAKING AND WAS ON VENT THIS AM.INFORMATION FROM WIFE Breanne Singh)    Patient Cognition: Short Term Memory Deficit (INFORMATION FROM WIFE DAYANA)    Hospitalization in the last 30 days (Readmission):  No    If yes, Readmission Assessment in CM Navigator will be completed. Advance Directives:      Code Status: Full Code   Patient's Primary Decision Maker is: Legal Next of Kin    Primary Decision Maker: Dayana Robison - Spouse - [phone]    Discharge Planning:    Patient lives with:   Type of Home:    Primary Care Giver: Spouse  Patient Support Systems include: Spouse/Significant Other, Children, Family Members   Current Financial resources:    Current community resources:    Current services prior to admission:              Current DME:  CPAP            Type of Home Care services:       ADLS  Prior functional level: Independent in ADLs/IADLs, Other (see comment) (WIFE DOES ASSIST PT WHEN NEEDED AND IS ALWAYS WITH HIM SHE STATES)  Current functional level:  Other (see comment) (PT ON VENT THIS AM AND MAY NEED PT/OT TO EVAL)    PT AM-PAC:   /24  OT AM-PAC:
show

## 2024-06-01 LAB
CANDIDA AB TITR SER: SIGNIFICANT CHANGE UP
G VAGINALIS DNA SPEC QL NAA+PROBE: SIGNIFICANT CHANGE UP
T VAGINALIS SPEC QL WET PREP: SIGNIFICANT CHANGE UP

## 2024-11-25 NOTE — ED ADULT TRIAGE NOTE - NS ED NURSE BANDS TYPE
Additional Notes: after treating area with LN2, a dilated pore was highlighted while lesion was frozen. discussed if lesion persists, it could be extracted or shave removed. pt understands. Detail Level: Simple Render Risk Assessment In Note?: no Name band;

## 2025-04-04 ENCOUNTER — EMERGENCY (EMERGENCY)
Facility: HOSPITAL | Age: 60
LOS: 1 days | Discharge: HOME | End: 2025-04-04
Attending: EMERGENCY MEDICINE | Admitting: EMERGENCY MEDICINE
Payer: MEDICARE

## 2025-04-04 VITALS
HEART RATE: 76 BPM | RESPIRATION RATE: 18 BRPM | OXYGEN SATURATION: 98 % | HEIGHT: 65 IN | SYSTOLIC BLOOD PRESSURE: 162 MMHG | DIASTOLIC BLOOD PRESSURE: 85 MMHG | TEMPERATURE: 98 F | WEIGHT: 164.91 LBS

## 2025-04-04 VITALS
OXYGEN SATURATION: 99 % | SYSTOLIC BLOOD PRESSURE: 178 MMHG | DIASTOLIC BLOOD PRESSURE: 82 MMHG | TEMPERATURE: 98 F | HEART RATE: 72 BPM | RESPIRATION RATE: 18 BRPM

## 2025-04-04 LAB
ALBUMIN SERPL ELPH-MCNC: 4.2 G/DL — SIGNIFICANT CHANGE UP (ref 3.3–5)
ALP SERPL-CCNC: 84 U/L — SIGNIFICANT CHANGE UP (ref 40–120)
ALT FLD-CCNC: 24 U/L — SIGNIFICANT CHANGE UP (ref 10–45)
ANION GAP SERPL CALC-SCNC: 12 MMOL/L — SIGNIFICANT CHANGE UP (ref 5–17)
APPEARANCE UR: CLEAR — SIGNIFICANT CHANGE UP
AST SERPL-CCNC: 17 U/L — SIGNIFICANT CHANGE UP (ref 10–40)
BILIRUB DIRECT SERPL-MCNC: 0.1 MG/DL — SIGNIFICANT CHANGE UP (ref 0–0.3)
BILIRUB INDIRECT FLD-MCNC: 0.3 MG/DL — SIGNIFICANT CHANGE UP (ref 0.2–1)
BILIRUB SERPL-MCNC: 0.4 MG/DL — SIGNIFICANT CHANGE UP (ref 0.2–1.2)
BILIRUB UR-MCNC: NEGATIVE — SIGNIFICANT CHANGE UP
BUN SERPL-MCNC: 23 MG/DL — SIGNIFICANT CHANGE UP (ref 7–23)
CALCIUM SERPL-MCNC: 9.3 MG/DL — SIGNIFICANT CHANGE UP (ref 8.4–10.5)
CHLORIDE SERPL-SCNC: 102 MMOL/L — SIGNIFICANT CHANGE UP (ref 96–108)
CO2 SERPL-SCNC: 25 MMOL/L — SIGNIFICANT CHANGE UP (ref 22–31)
COLOR SPEC: YELLOW — SIGNIFICANT CHANGE UP
CREAT SERPL-MCNC: 0.86 MG/DL — SIGNIFICANT CHANGE UP (ref 0.5–1.3)
DIFF PNL FLD: ABNORMAL
EGFR: 78 ML/MIN/1.73M2 — SIGNIFICANT CHANGE UP
EGFR: 78 ML/MIN/1.73M2 — SIGNIFICANT CHANGE UP
GLUCOSE SERPL-MCNC: 97 MG/DL — SIGNIFICANT CHANGE UP (ref 70–99)
GLUCOSE UR QL: NEGATIVE MG/DL — SIGNIFICANT CHANGE UP
HCT VFR BLD CALC: 39.5 % — SIGNIFICANT CHANGE UP (ref 34.5–45)
HGB BLD-MCNC: 13.6 G/DL — SIGNIFICANT CHANGE UP (ref 11.5–15.5)
KETONES UR-MCNC: NEGATIVE MG/DL — SIGNIFICANT CHANGE UP
LEUKOCYTE ESTERASE UR-ACNC: NEGATIVE — SIGNIFICANT CHANGE UP
MCHC RBC-ENTMCNC: 31.6 PG — SIGNIFICANT CHANGE UP (ref 27–34)
MCHC RBC-ENTMCNC: 34.4 G/DL — SIGNIFICANT CHANGE UP (ref 32–36)
MCV RBC AUTO: 91.9 FL — SIGNIFICANT CHANGE UP (ref 80–100)
NITRITE UR-MCNC: NEGATIVE — SIGNIFICANT CHANGE UP
NRBC BLD AUTO-RTO: 0 /100 WBCS — SIGNIFICANT CHANGE UP (ref 0–0)
PH UR: 5 — SIGNIFICANT CHANGE UP (ref 5–8)
PLATELET # BLD AUTO: 301 K/UL — SIGNIFICANT CHANGE UP (ref 150–400)
POTASSIUM SERPL-MCNC: 3.5 MMOL/L — SIGNIFICANT CHANGE UP (ref 3.5–5.3)
POTASSIUM SERPL-SCNC: 3.5 MMOL/L — SIGNIFICANT CHANGE UP (ref 3.5–5.3)
PROT SERPL-MCNC: 7.4 G/DL — SIGNIFICANT CHANGE UP (ref 6–8.3)
PROT UR-MCNC: NEGATIVE MG/DL — SIGNIFICANT CHANGE UP
RBC # BLD: 4.3 M/UL — SIGNIFICANT CHANGE UP (ref 3.8–5.2)
RBC # FLD: 13.4 % — SIGNIFICANT CHANGE UP (ref 10.3–14.5)
SODIUM SERPL-SCNC: 139 MMOL/L — SIGNIFICANT CHANGE UP (ref 135–145)
SP GR SPEC: 1.03 — SIGNIFICANT CHANGE UP (ref 1–1.03)
UROBILINOGEN FLD QL: 0.2 MG/DL — SIGNIFICANT CHANGE UP (ref 0.2–1)
WBC # BLD: 7.75 K/UL — SIGNIFICANT CHANGE UP (ref 3.8–10.5)
WBC # FLD AUTO: 7.75 K/UL — SIGNIFICANT CHANGE UP (ref 3.8–10.5)

## 2025-04-04 PROCEDURE — 76856 US EXAM PELVIC COMPLETE: CPT | Mod: 26

## 2025-04-04 PROCEDURE — 81513 NFCT DS BV RNA VAG FLU ALG: CPT

## 2025-04-04 PROCEDURE — 87481 CANDIDA DNA AMP PROBE: CPT

## 2025-04-04 PROCEDURE — 99284 EMERGENCY DEPT VISIT MOD MDM: CPT | Mod: 25

## 2025-04-04 PROCEDURE — 36415 COLL VENOUS BLD VENIPUNCTURE: CPT

## 2025-04-04 PROCEDURE — 76830 TRANSVAGINAL US NON-OB: CPT

## 2025-04-04 PROCEDURE — 85027 COMPLETE CBC AUTOMATED: CPT

## 2025-04-04 PROCEDURE — 76830 TRANSVAGINAL US NON-OB: CPT | Mod: 26

## 2025-04-04 PROCEDURE — 76856 US EXAM PELVIC COMPLETE: CPT

## 2025-04-04 PROCEDURE — 87661 TRICHOMONAS VAGINALIS AMPLIF: CPT

## 2025-04-04 PROCEDURE — 80048 BASIC METABOLIC PNL TOTAL CA: CPT

## 2025-04-04 PROCEDURE — 87491 CHLMYD TRACH DNA AMP PROBE: CPT

## 2025-04-04 PROCEDURE — 81001 URINALYSIS AUTO W/SCOPE: CPT

## 2025-04-04 PROCEDURE — 80076 HEPATIC FUNCTION PANEL: CPT

## 2025-04-04 PROCEDURE — 87591 N.GONORRHOEAE DNA AMP PROB: CPT

## 2025-04-04 PROCEDURE — 99285 EMERGENCY DEPT VISIT HI MDM: CPT

## 2025-04-04 RX ORDER — OMEPRAZOLE 20 MG/1
1 CAPSULE, DELAYED RELEASE ORAL
Qty: 30 | Refills: 0
Start: 2025-04-04

## 2025-04-04 RX ORDER — MAGNESIUM, ALUMINUM HYDROXIDE 200-200 MG
30 TABLET,CHEWABLE ORAL ONCE
Refills: 0 | Status: COMPLETED | OUTPATIENT
Start: 2025-04-04 | End: 2025-04-04

## 2025-04-04 RX ORDER — LIDOCAINE HYDROCHLORIDE 20 MG/ML
10 JELLY TOPICAL ONCE
Refills: 0 | Status: COMPLETED | OUTPATIENT
Start: 2025-04-04 | End: 2025-04-04

## 2025-04-04 RX ADMIN — Medication 30 MILLILITER(S): at 17:24

## 2025-04-04 RX ADMIN — LIDOCAINE HYDROCHLORIDE 10 MILLILITER(S): 20 JELLY TOPICAL at 17:24

## 2025-04-04 RX ADMIN — Medication 40 MILLIGRAM(S): at 16:40
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
## 2025-04-04 NOTE — ED ADULT NURSE NOTE - ISOLATION TYPE:
Requested Prescriptions     Pending Prescriptions Disp Refills    HYDROcodone-acetaminophen (NORCO) 5-325 mg per tablet 120 Tab 0     Sig: Take 1 Tab by mouth every six (6) hours as needed for Pain. Max Daily Amount: 4 Tabs. None
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
## 2025-04-04 NOTE — ED PROVIDER NOTE - PROGRESS NOTE DETAILS
Labs wnl, gi sx improved after meds.  U/S w small cystic structure on R and abnl appearing endometrium - discussed both w pt as well as need to fu w gyn. Will flag chart for cancer care direct and for gyn.  Pt to fu w pcp and gi for gi sx.  [facility] precautions reviewed w pt.  Omeprazole rx sent.  cell [phone]
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
## 2025-04-04 NOTE — ED PROVIDER NOTE - NSFOLLOWUPINSTRUCTIONS_ED_ALL_ED_FT
Head injury  Gastritis  Pelvic pain    Return for increased pain, change in behavior, change in vision/speech/gait, numbness or weakness in extremities, vomiting, any other concerns.     Return for increased pain, vomiting or diarrhea, fever, any other concerns.     Follow up with your pcp and GYN.     Try omeprazole once a day.  Add tums/maalox/mylanta etc as directed for additional relief.  Avoid spicy and/or acidic foods (citrus, tomatoes, etc), alcohol, caffeine, and NSAID medications (ibuprofen, aleve, advil, motrin, etc).  Follow up with your pmd and a gastroenterologist.  Return for increased pain, vomiting or diarrhea, fever, black/bloody stools, any other concerns.     ---  Closed Head Injury    A closed head injury is an injury to your head that may or may not involve a traumatic brain injury (TBI). Symptoms of TBI can be short or long lasting and include headache, dizziness, interference with memory or speech, fatigue, confusion, changes in sleep, mood changes, nausea, depression/anxiety, and dulling of senses. Make sure to obtain proper rest which includes getting plenty of sleep, avoiding excessive visual stimulation, and avoiding activities that may cause physical or mental stress. Avoid any situation where there is potential for another head injury, including sports.    SEEK IMMEDIATE MEDICAL CARE IF YOU HAVE ANY OF THE FOLLOWING SYMPTOMS: unusual drowsiness, vomiting, severe dizziness, seizures, lightheadedness, muscular weakness, different pupil sizes, visual changes, or clear or bloody discharge from your ears or nose.    Head Injury, Adult    There are many types of head injuries. Head injuries can be as minor as a bump, or they can be more severe. More severe head injuries include:  A jarring injury to the brain (concussion).  A bruise of the brain (contusion). This means there is bleeding in the brain that can cause swelling.  A cracked skull (skull fracture).  Bleeding in the brain that collects, clots, and forms a bump (hematoma).    After a head injury, you may need to be observed for a while in the emergency department or urgent care. Sometimes admission to the hospital is needed.    After a head injury has happened, most problems occur within the first 24 hours, but side effects may occur up to 7–10 days after the injury. It is important to watch your condition for any changes.    What are the causes?  There are many possible causes of a head injury. A serious head injury may happen to someone who is in a car accident (motor vehicle collision). Other causes of major head injuries include bicycle or motorcycle accidents, sports injuries, and falls.    Risk factors  This condition is more likely to occur in people who:  Drink a lot of alcohol or use drugs.  Are over the age of 65.  Are at risk for falls.    What are the symptoms?  There are many possible symptoms of a head injury. Visible symptoms of a head injury include a bruise, bump, or bleeding at the site of the injury. Other non-visible symptoms include:  Feeling sleepy or not being able to stay awake.  Passing out.  Headache.  Seizures.  Dizziness.  Confusion.  Memory problems.  Nausea or vomiting.  Other possible symptoms that may develop after the head injury include:  Poor attention and concentration.  Fatigue or tiring easily.  Irritability.  Being uncomfortable around bright lights or loud noises.  Anxiety or depression.  Disturbed sleep.    How is this diagnosed?  This condition can usually be diagnosed based on your symptoms, a description of the injury, and a physical exam. You may also have imaging tests done, such as a CT scan or MRI. You will also be closely watched.    How is this treated?  Treatment for this condition depends on the severity and type of injury you have. The main goal of treatment is to prevent complications and allow the brain time to heal.    For mild head injury, you may be sent home and treatment may include:  Observation. A responsible adult should stay with you for 24 hours after your injury and check on you often.  Physical rest.  Brain rest.  Pain medicines.  For severe brain injury, treatment may include:  Close observation. This includes hospitalization with frequent physical exams. You may need to go to a hospital that specializes in head injury.  Pain medicines.  Breathing support. This may include using a ventilator.  Managing the pressure inside the brain (intracranial pressure, or ICP). This may include:  Monitoring the ICP.  Giving medicines to decrease the ICP.  Positioning you to decrease the ICP.  Medicine to prevent seizures.  Surgery to stop bleeding or to remove blood clots (craniotomy).  Surgery to remove part of the skull (decompressive craniectomy). This allows room for the brain to swell.    Follow these instructions at home:  Activity   Rest as much as possible and avoid activities that are physically hard or tiring.  Make sure you get enough sleep.  Limit activities that require a lot of thought or attention, such as:  Watching TV.  Playing memory games and puzzles.  Job-related work or homework.  Working on the computer, social media, and texting.  Avoid activities that could cause another head injury, such as playing sports, until your health care provider approves. Having another head injury, especially before the first one has healed, can be dangerous.    Ask your health care provider when it is safe for you to return to your regular activities, including work or school. Ask your health care provider for a step-by-step plan for gradually returning to activities.  Ask your health care provider when you can drive, ride a bicycle, or use heavy machinery. Your ability to react may be slower after a brain injury. Never do these activities if you are dizzy.    Lifestyle     Do not drink alcohol until your health care provider approves, and avoid drug use. Alcohol and certain drugs may slow your recovery and can put you at risk of further injury.  If it is harder than usual to remember things, write them down.  If you are easily distracted, try to do one thing at a time.  Talk with family members or close friends when making important decisions.  Tell your friends, family, a trusted colleague, and  about your injury, symptoms, and restrictions. Have them watch for any new or worsening problems.  General instructions     Take over-the-counter and prescription medicines only as told by your health care provider.  Have someone stay with you for 24 hours after your head injury. This person should watch you for any changes in your symptoms and be ready to seek medical help, as needed.  Keep all follow-up visits as told by your health care provider. This is important.    How is this prevented?  Work on improving your balance and strength to avoid falls.  Wear a seatbelt when you are in a moving vehicle.  Wear a helmet when riding a bicycle, skiing, or doing any other sport or activity that has a risk of injury.  Drink alcohol only in moderation.  Take safety measures in your home, such as:  Removing clutter and tripping hazards from floors and stairways.  Using grab bars in bathrooms and handrails by stairs.  Placing non-slip mats on floors and in bathtubs.  Improving lighting in dim areas.    Get help right away if:  You have:  A severe headache that is not helped by medicine.  Trouble walking, have weakness in your arms and legs, or lose your balance.  Clear or bloody fluid coming from your nose or ears.  Changes in your vision.  A seizure.  You vomit.  Your symptoms get worse.  Your speech is slurred.  You pass out.  You are sleepier and have trouble staying awake.  Your pupils change size.  These symptoms may represent a serious problem that is an emergency. Do not wait to see if the symptoms will go away. Get medical help right away. Call your local emergency services (911 in the [place] Do not drive yourself to the hospital.     Post-Concussion Syndrome    A concussion is a brain injury from a direct hit (blow) to your head or body. This blow causes your brain to shake quickly back and forth inside your skull. This can damage brain cells and cause chemical changes in your brain. Concussions are usually not life-threatening but can cause several serious symptoms.    Post-concussion syndrome is when symptoms that occur after a concussion last longer than normal. These symptoms can last from weeks to months.    What are the causes?  The cause of this condition is not known. It can happen whether your head injury was mild or severe.    What increases the risk?  You are more likely to develop this condition if:  You are female.  You are a child, teen, or young adult.  You had a past head injury.  You have a history of headaches.  You have depression or anxiety.    What are the signs or symptoms?    Physical symptoms   Headaches.  Tiredness.  Dizziness.  Weakness.  Blurry vision.  Sensitivity to light.  Hearing difficulties.  Mental and emotional symptoms     Memory difficulties.  Difficulty with concentration.  Difficulty sleeping or staying asleep.  Feeling irritable.  Anxiety or depression.  Difficulty learning new things.    How is this diagnosed?  This condition may be diagnosed based on:  Your symptoms.  A description of your injury.  Your medical history.  Your health care provider may order other tests such as:  Brain function tests (neurological testing).  CT scan.    How is this treated?  Treatment for this condition may depend on your symptoms. Symptoms usually go away on their own over time. Treatments may include:  Medicines for headaches.  Resting your brain and body for a few days after your injury.  Rehabilitation therapy, such as:  Physical or occupational therapy. This may include exercises to help with balance and dizziness.  Mental health counseling.  Speech therapy.  Vision therapy. A brain and eye specialist can recommend treatments for vision problems.    Follow these instructions at home:  Medicines     Take over-the-counter and prescription medicines only as told by your health care provider.  Avoid opioid prescription pain medicines when recovering from a concussion.  Activity     Limit your mental activities for the first few days after your injury, such as:  Homework or job-related work.  Complex thinking.  Watching TV, and using a computer or phone.  Playing memory games and puzzles.  Gradually return to your normal activity level. If a certain activity brings on your symptoms, stop or slow down until you can do the activity without it triggering your symptoms.  Limit physical activity, such as exercise or sports, for the first few days after a concussion. Gradually return to normal activity as told by your health care provider.  If a certain activity brings on your symptoms, stop or slow down until you can do the activity without it triggering your symptoms.  Rest. Rest helps your brain heal. Make sure you:  Get plenty of sleep at night. Most adults should get at least 7–9 hours of sleep each night.  Rest during the day. Take naps or rest breaks when you feel tired.  Do not do high-risk activities that could cause a second concussion, such as riding a bike or playing sports. Having another concussion before the first one has healed can be dangerous.    General instructions   Do not drink alcohol until your health care provider says you can.  Keep track of the frequency and the severity of your symptoms. Give this information to your health care provider.  Keep all follow-up visits as directed by your health care provider. This is important.  Contact a health care provider if:  Your symptoms do not improve.  You have another injury.  Get help right away if you:  Have a severe or worsening headache.  Are confused.  Have trouble staying awake.  Pass out.  Vomit.  Have weakness or numbness in any part of your body.  Have a seizure.  Have trouble speaking.  Summary  Post-concussion syndrome is when symptoms that occur after a concussion last longer than normal.  Symptoms usually go away on their own over time. Depending on your symptoms, you may need treatment, such as medicines or rehabilitation therapy.  Rest your brain and body for a few days after your injury. Gradually return to activities, as told by your health care provider.  Get plenty of sleep, and avoid alcohol and opioid pain medicines while recovering from a concussion.   ---  Gastritis    Gastritis is soreness and swelling (inflammation) of the lining of the stomach. Gastritis can develop as a sudden onset (acute) or long-term (chronic) condition. If gastritis is not treated, it can lead to stomach bleeding and ulcers. Causes include viral and bacterial infections, excessive alcohol consumption, tobacco use, or certain medications. Symptoms include nausea, vomiting, or abdominal pain or burning especially after eating. Avoid foods or drinks that make your symptoms worse such as caffeine, chocolate, spicy foods, acidic foods, or alcohol.    SEEK IMMEDIATE MEDICAL CARE IF YOU HAVE ANY OF THE FOLLOWING SYMPTOMS: black or bloody stools, blood or coffee-ground-colored vomitus, worsening abdominal pain, fever, or inability to keep fluids down. Head injury  Gastritis  Pelvic pain    Return for increased pain, change in behavior, change in vision/speech/gait, numbness or weakness in extremities, vomiting, any other concerns.     Return for increased pain, vomiting or diarrhea, fever, any other concerns.     Follow up with your pcp and GYN.  You may call our referrals coordinator at [phone] Monday to Friday 11am-7pm for assistance with making an appointment     Your ultrasound today showed an cystic structure near the R ovary - this should be re-imaged in ~ 3 months; discuss with your pcp and/or gyn to order this.  It also showed abnormal appearing endometrium (uterine lining) that needs more evaluation with GYN - please contact your gyn this coming week to schedule follow up.  You may call [facility] [phone] for assistance in arranging follow up and/or imaging needs related to this abnormal finding; someone from their team should be reaching out to you.      Try omeprazole once a day.  Add tums/maalox/mylanta etc as directed for additional relief.  Avoid spicy and/or acidic foods (citrus, tomatoes, etc), alcohol, caffeine, and NSAID medications (ibuprofen, aleve, advil, motrin, etc).  Follow up with your pmd and a gastroenterologist.  Return for increased pain, vomiting or diarrhea, fever, black/bloody stools, any other concerns.     ---  Closed Head Injury    A closed head injury is an injury to your head that may or may not involve a traumatic brain injury (TBI). Symptoms of TBI can be short or long lasting and include headache, dizziness, interference with memory or speech, fatigue, confusion, changes in sleep, mood changes, nausea, depression/anxiety, and dulling of senses. Make sure to obtain proper rest which includes getting plenty of sleep, avoiding excessive visual stimulation, and avoiding activities that may cause physical or mental stress. Avoid any situation where there is potential for another head injury, including sports.    SEEK IMMEDIATE MEDICAL CARE IF YOU HAVE ANY OF THE FOLLOWING SYMPTOMS: unusual drowsiness, vomiting, severe dizziness, seizures, lightheadedness, muscular weakness, different pupil sizes, visual changes, or clear or bloody discharge from your ears or nose.    Head Injury, Adult    There are many types of head injuries. Head injuries can be as minor as a bump, or they can be more severe. More severe head injuries include:  A jarring injury to the brain (concussion).  A bruise of the brain (contusion). This means there is bleeding in the brain that can cause swelling.  A cracked skull (skull fracture).  Bleeding in the brain that collects, clots, and forms a bump (hematoma).    After a head injury, you may need to be observed for a while in the emergency department or urgent care. Sometimes admission to the hospital is needed.    After a head injury has happened, most problems occur within the first 24 hours, but side effects may occur up to 7–10 days after the injury. It is important to watch your condition for any changes.    What are the causes?  There are many possible causes of a head injury. A serious head injury may happen to someone who is in a car accident (motor vehicle collision). Other causes of major head injuries include bicycle or motorcycle accidents, sports injuries, and falls.    Risk factors  This condition is more likely to occur in people who:  Drink a lot of alcohol or use drugs.  Are over the age of 65.  Are at risk for falls.    What are the symptoms?  There are many possible symptoms of a head injury. Visible symptoms of a head injury include a bruise, bump, or bleeding at the site of the injury. Other non-visible symptoms include:  Feeling sleepy or not being able to stay awake.  Passing out.  Headache.  Seizures.  Dizziness.  Confusion.  Memory problems.  Nausea or vomiting.  Other possible symptoms that may develop after the head injury include:  Poor attention and concentration.  Fatigue or tiring easily.  Irritability.  Being uncomfortable around bright lights or loud noises.  Anxiety or depression.  Disturbed sleep.    How is this diagnosed?  This condition can usually be diagnosed based on your symptoms, a description of the injury, and a physical exam. You may also have imaging tests done, such as a CT scan or MRI. You will also be closely watched.    How is this treated?  Treatment for this condition depends on the severity and type of injury you have. The main goal of treatment is to prevent complications and allow the brain time to heal.    For mild head injury, you may be sent home and treatment may include:  Observation. A responsible adult should stay with you for 24 hours after your injury and check on you often.  Physical rest.  Brain rest.  Pain medicines.  For severe brain injury, treatment may include:  Close observation. This includes hospitalization with frequent physical exams. You may need to go to a hospital that specializes in head injury.  Pain medicines.  Breathing support. This may include using a ventilator.  Managing the pressure inside the brain (intracranial pressure, or ICP). This may include:  Monitoring the ICP.  Giving medicines to decrease the ICP.  Positioning you to decrease the ICP.  Medicine to prevent seizures.  Surgery to stop bleeding or to remove blood clots (craniotomy).  Surgery to remove part of the skull (decompressive craniectomy). This allows room for the brain to swell.    Follow these instructions at home:  Activity   Rest as much as possible and avoid activities that are physically hard or tiring.  Make sure you get enough sleep.  Limit activities that require a lot of thought or attention, such as:  Watching TV.  Playing memory games and puzzles.  Job-related work or homework.  Working on the computer, social media, and texting.  Avoid activities that could cause another head injury, such as playing sports, until your health care provider approves. Having another head injury, especially before the first one has healed, can be dangerous.    Ask your health care provider when it is safe for you to return to your regular activities, including work or school. Ask your health care provider for a step-by-step plan for gradually returning to activities.  Ask your health care provider when you can drive, ride a bicycle, or use heavy machinery. Your ability to react may be slower after a brain injury. Never do these activities if you are dizzy.    Lifestyle     Do not drink alcohol until your health care provider approves, and avoid drug use. Alcohol and certain drugs may slow your recovery and can put you at risk of further injury.  If it is harder than usual to remember things, write them down.  If you are easily distracted, try to do one thing at a time.  Talk with family members or close friends when making important decisions.  Tell your friends, family, a trusted colleague, and  about your injury, symptoms, and restrictions. Have them watch for any new or worsening problems.  General instructions     Take over-the-counter and prescription medicines only as told by your health care provider.  Have someone stay with you for 24 hours after your head injury. This person should watch you for any changes in your symptoms and be ready to seek medical help, as needed.  Keep all follow-up visits as told by your health care provider. This is important.    How is this prevented?  Work on improving your balance and strength to avoid falls.  Wear a seatbelt when you are in a moving vehicle.  Wear a helmet when riding a bicycle, skiing, or doing any other sport or activity that has a risk of injury.  Drink alcohol only in moderation.  Take safety measures in your home, such as:  Removing clutter and tripping hazards from floors and stairways.  Using grab bars in bathrooms and handrails by stairs.  Placing non-slip mats on floors and in bathtubs.  Improving lighting in dim areas.    Get help right away if:  You have:  A severe headache that is not helped by medicine.  Trouble walking, have weakness in your arms and legs, or lose your balance.  Clear or bloody fluid coming from your nose or ears.  Changes in your vision.  A seizure.  You vomit.  Your symptoms get worse.  Your speech is slurred.  You pass out.  You are sleepier and have trouble staying awake.  Your pupils change size.  These symptoms may represent a serious problem that is an emergency. Do not wait to see if the symptoms will go away. Get medical help right away. Call your local emergency services (911 in the [place] Do not drive yourself to the hospital.     Post-Concussion Syndrome    A concussion is a brain injury from a direct hit (blow) to your head or body. This blow causes your brain to shake quickly back and forth inside your skull. This can damage brain cells and cause chemical changes in your brain. Concussions are usually not life-threatening but can cause several serious symptoms.    Post-concussion syndrome is when symptoms that occur after a concussion last longer than normal. These symptoms can last from weeks to months.    What are the causes?  The cause of this condition is not known. It can happen whether your head injury was mild or severe.    What increases the risk?  You are more likely to develop this condition if:  You are female.  You are a child, teen, or young adult.  You had a past head injury.  You have a history of headaches.  You have depression or anxiety.    What are the signs or symptoms?    Physical symptoms   Headaches.  Tiredness.  Dizziness.  Weakness.  Blurry vision.  Sensitivity to light.  Hearing difficulties.  Mental and emotional symptoms     Memory difficulties.  Difficulty with concentration.  Difficulty sleeping or staying asleep.  Feeling irritable.  Anxiety or depression.  Difficulty learning new things.    How is this diagnosed?  This condition may be diagnosed based on:  Your symptoms.  A description of your injury.  Your medical history.  Your health care provider may order other tests such as:  Brain function tests (neurological testing).  CT scan.    How is this treated?  Treatment for this condition may depend on your symptoms. Symptoms usually go away on their own over time. Treatments may include:  Medicines for headaches.  Resting your brain and body for a few days after your injury.  Rehabilitation therapy, such as:  Physical or occupational therapy. This may include exercises to help with balance and dizziness.  Mental health counseling.  Speech therapy.  Vision therapy. A brain and eye specialist can recommend treatments for vision problems.    Follow these instructions at home:  Medicines     Take over-the-counter and prescription medicines only as told by your health care provider.  Avoid opioid prescription pain medicines when recovering from a concussion.  Activity     Limit your mental activities for the first few days after your injury, such as:  Homework or job-related work.  Complex thinking.  Watching TV, and using a computer or phone.  Playing memory games and puzzles.  Gradually return to your normal activity level. If a certain activity brings on your symptoms, stop or slow down until you can do the activity without it triggering your symptoms.  Limit physical activity, such as exercise or sports, for the first few days after a concussion. Gradually return to normal activity as told by your health care provider.  If a certain activity brings on your symptoms, stop or slow down until you can do the activity without it triggering your symptoms.  Rest. Rest helps your brain heal. Make sure you:  Get plenty of sleep at night. Most adults should get at least 7–9 hours of sleep each night.  Rest during the day. Take naps or rest breaks when you feel tired.  Do not do high-risk activities that could cause a second concussion, such as riding a bike or playing sports. Having another concussion before the first one has healed can be dangerous.    General instructions   Do not drink alcohol until your health care provider says you can.  Keep track of the frequency and the severity of your symptoms. Give this information to your health care provider.  Keep all follow-up visits as directed by your health care provider. This is important.  Contact a health care provider if:  Your symptoms do not improve.  You have another injury.  Get help right away if you:  Have a severe or worsening headache.  Are confused.  Have trouble staying awake.  Pass out.  Vomit.  Have weakness or numbness in any part of your body.  Have a seizure.  Have trouble speaking.  Summary  Post-concussion syndrome is when symptoms that occur after a concussion last longer than normal.  Symptoms usually go away on their own over time. Depending on your symptoms, you may need treatment, such as medicines or rehabilitation therapy.  Rest your brain and body for a few days after your injury. Gradually return to activities, as told by your health care provider.  Get plenty of sleep, and avoid alcohol and opioid pain medicines while recovering from a concussion.   ---  Gastritis    Gastritis is soreness and swelling (inflammation) of the lining of the stomach. Gastritis can develop as a sudden onset (acute) or long-term (chronic) condition. If gastritis is not treated, it can lead to stomach bleeding and ulcers. Causes include viral and bacterial infections, excessive alcohol consumption, tobacco use, or certain medications. Symptoms include nausea, vomiting, or abdominal pain or burning especially after eating. Avoid foods or drinks that make your symptoms worse such as caffeine, chocolate, spicy foods, acidic foods, or alcohol.    SEEK IMMEDIATE MEDICAL CARE IF YOU HAVE ANY OF THE FOLLOWING SYMPTOMS: black or bloody stools, blood or coffee-ground-colored vomitus, worsening abdominal pain, fever, or inability to keep fluids down.

## 2025-04-04 NOTE — ED PROVIDER NOTE - PHYSICAL EXAMINATION
VITAL SIGNS: I have reviewed nursing notes and confirm.  CONSTITUTIONAL:  in no acute distress.   SKIN:  warm and dry, no acute rash.   HEAD:  normocephalic, hematoma L forehead  EYES: PERRL, EOM intact; conjunctiva and sclera clear.  ENT: No nasal discharge; airway clear.  vision L 20/25-1, R 20/20, OU 20/20, no fluorescein uptake L eye, lid/lashes nl, cornea clear, sclera/conjunctivae clear, no lid swelling, ecchymosis  NECK: Supple; non tender.  CARD: S1, S2 normal; no murmurs, gallops, or rubs. Regular rate and rhythm.   RESP:  Clear to auscultation b/l, no wheezes, rales or rhonchi.  ABD: Normal bowel sounds; soft; non-distended; non-tender; no guarding/ rebound.  GYN: nl ext genitalia, no cmt,  no uterine or adnexal ttp, no mass , no sig discharge, no sig odor noted  MSK: Normal ROM. No clubbing, cyanosis or edema. no ttp bilat le  NEURO: awake, alert, oriented x 3, CN II-XII grossly intact, motor 5/5, no gross sens deficits, speech clear, nl gait  PSYCH: Cooperative, mood and affect appropriate.

## 2025-04-04 NOTE — ED PROVIDER NOTE - PATIENT PORTAL LINK FT
You can access the FollowMyHealth Patient Portal offered by [facility] by registering at the following website: [email or web address]. By joining [facility]’s FollowMyHealth portal, you will also be able to view your health information using other applications (apps) compatible with our system.

## 2025-04-04 NOTE — ED PROVIDER NOTE - CARE PLAN
Principal Discharge DX:	Head injury  Secondary Diagnosis:	Gastritis  Secondary Diagnosis:	Pelvic pain   1
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
## 2025-04-04 NOTE — ED PROVIDER NOTE - CLINICAL SUMMARY MEDICAL DECISION MAKING FREE TEXT BOX
Pt c/o 1. minor chi - no sig concern for ic injury or need for imaging based on hpi and exam.  Supportive care reviewed.  2. pelvic pain, vag odor - exam w/o sig abnl, no sig discharge or odor noted.  Prior testing for similar sx neg so will hold on abx for BV until vaginitis swab results.  Pelvic u/s ordered.  No sig concern for ovarian cyst, std/pid based on hpi and exam.  Pt to fu w gyn.  3. epigastric pain - suspect gastritis, no ruq ttp to suggest cholecystitis, ekg wnl - no concern for atypical acs.  Will check labs, try ppi, gi cocktail.  reassess. See progress notes for further mdm related documentation.

## 2025-04-06 LAB
BV BACTERIA RRNA VAG QL NAA+PROBE: SIGNIFICANT CHANGE UP
C GLABRATA RNA VAG QL NAA+PROBE: SIGNIFICANT CHANGE UP
C TRACH RRNA SPEC QL NAA+PROBE: SIGNIFICANT CHANGE UP
CANDIDA RRNA VAG QL PROBE: SIGNIFICANT CHANGE UP
N GONORRHOEA RRNA SPEC QL NAA+PROBE: SIGNIFICANT CHANGE UP
T VAGINALIS RRNA SPEC QL NAA+PROBE: SIGNIFICANT CHANGE UP
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
## 2025-04-07 DIAGNOSIS — R10.2 PELVIC AND PERINEAL PAIN: ICD-10-CM

## 2025-04-07 DIAGNOSIS — R73.03 PREDIABETES: ICD-10-CM

## 2025-04-07 DIAGNOSIS — J45.909 UNSPECIFIED ASTHMA, UNCOMPLICATED: ICD-10-CM

## 2025-04-07 DIAGNOSIS — K29.70 GASTRITIS, UNSPECIFIED, WITHOUT BLEEDING: ICD-10-CM
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
## 2025-04-07 DIAGNOSIS — S09.90XA UNSPECIFIED INJURY OF HEAD, INITIAL ENCOUNTER: ICD-10-CM

## 2025-04-07 DIAGNOSIS — R00.1 BRADYCARDIA, UNSPECIFIED: ICD-10-CM

## 2025-04-07 DIAGNOSIS — Z88.5 ALLERGY STATUS TO NARCOTIC AGENT: ICD-10-CM

## 2025-04-07 DIAGNOSIS — S00.83XA CONTUSION OF OTHER PART OF HEAD, INITIAL ENCOUNTER: ICD-10-CM

## 2025-04-07 DIAGNOSIS — I10 ESSENTIAL (PRIMARY) HYPERTENSION: ICD-10-CM

## 2025-04-07 DIAGNOSIS — Z86.19 PERSONAL HISTORY OF OTHER INFECTIOUS AND PARASITIC DISEASES: ICD-10-CM

## 2025-04-10 PROBLEM — Z00.00 ENCOUNTER FOR PREVENTIVE HEALTH EXAMINATION: Status: ACTIVE | Noted: 2025-04-10

## 2025-04-11 ENCOUNTER — APPOINTMENT (OUTPATIENT)
Dept: OBGYN | Facility: CLINIC | Age: 60
End: 2025-04-11

## 2025-04-11 VITALS
OXYGEN SATURATION: 100 % | DIASTOLIC BLOOD PRESSURE: 76 MMHG | WEIGHT: 158 LBS | HEART RATE: 86 BPM | SYSTOLIC BLOOD PRESSURE: 128 MMHG | BODY MASS INDEX: 25.39 KG/M2 | HEIGHT: 66 IN

## 2025-04-11 DIAGNOSIS — Z82.49 FAMILY HISTORY OF ISCHEMIC HEART DISEASE AND OTHER DISEASES OF THE CIRCULATORY SYSTEM: ICD-10-CM

## 2025-04-11 DIAGNOSIS — N95.0 POSTMENOPAUSAL BLEEDING: ICD-10-CM

## 2025-04-11 DIAGNOSIS — J45.909 UNSPECIFIED ASTHMA, UNCOMPLICATED: ICD-10-CM

## 2025-04-11 DIAGNOSIS — Z83.3 FAMILY HISTORY OF DIABETES MELLITUS: ICD-10-CM

## 2025-04-11 DIAGNOSIS — Z82.5 FAMILY HISTORY OF ASTHMA AND OTHER CHRONIC LOWER RESPIRATORY DISEASES: ICD-10-CM

## 2025-04-11 DIAGNOSIS — F32.A DEPRESSION, UNSPECIFIED: ICD-10-CM

## 2025-04-11 DIAGNOSIS — N89.8 OTHER SPECIFIED NONINFLAMMATORY DISORDERS OF VAGINA: ICD-10-CM

## 2025-04-11 DIAGNOSIS — Z80.49 FAMILY HISTORY OF MALIGNANT NEOPLASM OF OTHER GENITAL ORGANS: ICD-10-CM

## 2025-04-11 DIAGNOSIS — R73.03 PREDIABETES.: ICD-10-CM

## 2025-04-11 DIAGNOSIS — I10 ESSENTIAL (PRIMARY) HYPERTENSION: ICD-10-CM

## 2025-04-11 DIAGNOSIS — Z80.0 FAMILY HISTORY OF MALIGNANT NEOPLASM OF DIGESTIVE ORGANS: ICD-10-CM

## 2025-04-11 PROCEDURE — 58100 BIOPSY OF UTERUS LINING: CPT

## 2025-04-11 PROCEDURE — 99203 OFFICE O/P NEW LOW 30 MIN: CPT | Mod: 25

## 2025-04-11 PROCEDURE — 99459 PELVIC EXAMINATION: CPT

## 2025-04-11 NOTE — PROCEDURE
[Endometrial Biopsy] : Endometrial biopsy [Thickened Endometrium] : thickened endometrium [Post-Menop. Bleeding] : post-menopausal bleeding [Risks] : risks [Benefits] : benefits [Alternatives] : alternatives [Patient] : patient [Infection] : infection [Bleeding] : bleeding [Uterine Perforation] : uterine perforation [Pain] : pain [N/A] : pregnancy test not applicable [No Premedication] : No premedication [None] : none [Tenaculum] : Tenaculum [Easy Passage] : Easy passage [Sounded to ___ cm] : sounded to [unfilled] ~Ucm [Scant] : scant [Specimen Collected] : collected [Sent to Pathology] : placed in buffered formalin and sent for pathology [Tolerated Well] : Patient tolerated the procedure well [No Complications] : No complications

## 2025-04-11 NOTE — PHYSICAL EXAM
[MA] : MA [Appropriately responsive] : appropriately responsive [Alert] : alert [No Acute Distress] : no acute distress [Oriented x3] : oriented x3 [Labia Majora] : normal [Labia Minora] : normal [Normal] : normal [Uterine Adnexae] : normal [FreeTextEntry2] : Sheela Pennington

## 2025-04-11 NOTE — PLAN
[FreeTextEntry1] : 60 yo P5 presents for follow up of PMB, noted to have small simple appearing cyst on imaging and with report of pelvic pain.   #Ovarian cyst - Reviewed imaging in detail - Discussed will repeat US in 3 mo to reassess cyst  #PMB - EMB  - Post procedure care and instructions provided to pt. Discussed should call or go to ED if she experiences heavy bleeding, severe pain, fevers/chills  #Pelvic pain - Rec sx diary  RCT with symptom diary to discuss pelvic pain further  >30 min spent reviewing records, obtaining history, performing physical exam, counseling pt, documenting visit (not including time spent on procedure).
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
## 2025-04-11 NOTE — PLAN
[FreeTextEntry1] : 58 yo P5 presents for follow up of PMB, noted to have small simple appearing cyst on imaging and with report of pelvic pain.   #Ovarian cyst - Reviewed imaging in detail - Discussed will repeat US in 3 mo to reassess cyst  #PMB - EMB  - Post procedure care and instructions provided to pt. Discussed should call or go to ED if she experiences heavy bleeding, severe pain, fevers/chills  #Pelvic pain - Rec sx diary  RCT with symptom diary to discuss pelvic pain further  >30 min spent reviewing records, obtaining history, performing physical exam, counseling pt, documenting visit (not including time spent on procedure).

## 2025-04-14 ENCOUNTER — NON-APPOINTMENT (OUTPATIENT)
Age: 60
End: 2025-04-14

## 2025-04-14 LAB — CORE LAB BIOPSY: NORMAL

## 2025-04-15 LAB — HPV HIGH+LOW RISK DNA PNL CVX: NOT DETECTED

## 2025-04-17 LAB — CYTOLOGY CVX/VAG DOC THIN PREP: ABNORMAL

## 2025-04-29 ENCOUNTER — APPOINTMENT (OUTPATIENT)
Dept: OBGYN | Facility: CLINIC | Age: 60
End: 2025-04-29
Payer: MEDICARE

## 2025-04-29 VITALS
DIASTOLIC BLOOD PRESSURE: 76 MMHG | SYSTOLIC BLOOD PRESSURE: 112 MMHG | BODY MASS INDEX: 25.39 KG/M2 | WEIGHT: 158 LBS | HEART RATE: 86 BPM | HEIGHT: 66 IN | OXYGEN SATURATION: 98 %

## 2025-04-29 DIAGNOSIS — N94.89 OTHER SPECIFIED CONDITIONS ASSOCIATED WITH FEMALE GENITAL ORGANS AND MENSTRUAL CYCLE: ICD-10-CM

## 2025-04-29 DIAGNOSIS — N95.0 POSTMENOPAUSAL BLEEDING: ICD-10-CM

## 2025-04-29 DIAGNOSIS — N89.8 OTHER SPECIFIED NONINFLAMMATORY DISORDERS OF VAGINA: ICD-10-CM

## 2025-04-29 PROCEDURE — 99459 PELVIC EXAMINATION: CPT

## 2025-04-29 PROCEDURE — 99214 OFFICE O/P EST MOD 30 MIN: CPT

## 2025-04-29 NOTE — HISTORY OF PRESENT ILLNESS
[FreeTextEntry1] : 60 yo P5 presents for follow up of PMB, s/p EMB last visit. Pt also noted to have small simple appearing cyst on imaging. Plan made to repeat imaging in 3 mo. Reports pelvic pain has improved since last visit. Reports has noticed new vaginal odor and thinks it is similar to when she had BV previously. Denies any subsequent episodes of vaginal bleeding since initial PMB episode.   EMB 4/11/2025:  Surgical Pathology Report - Auth (Verified)  Specimen(s) Submitted 1  endometrial biopsy  Final Diagnosis Endometrium, biopsy:      -   Scant, superficial fragments of atrophic-appearing endometrium

## 2025-04-29 NOTE — PLAN
[FreeTextEntry1] : 58 yo P5 presents for follow up of PMB and ovarian cyst, also with new vaginal odor.   #PMB - Reviewed EMB results in detail - benign - Discussed if she has any further PMB she should RTC immediately. Discussed option of hysteroscopy D&C.   #Ovarian cyst - Tumor markers today - TVUS ordered for 3 mo from now  #Vaginal odor - Repeat vaginitis swab today (neg last visit)  RTC after repeat US in 3 mo

## 2025-04-29 NOTE — PHYSICAL EXAM
[Appropriately responsive] : appropriately responsive [Alert] : alert [No Acute Distress] : no acute distress [Oriented x3] : oriented x3 [MA] : MA [Labia Majora] : normal [Labia Minora] : normal [Normal] : normal [Uterine Adnexae] : normal [FreeTextEntry2] : Basilia Hendrix

## 2025-04-30 LAB
AFP-TM SERPL-MCNC: 2.1 NG/ML
CANCER AG125 SERPL-ACNC: 8 U/ML
CANCER AG19-9 SERPL-ACNC: 5 U/ML
CEA SERPL-MCNC: 0.9 NG/ML
ESTRADIOL SERPL-MCNC: <5 PG/ML
HCT VFR BLD CALC: 42.4 %
HGB BLD-MCNC: 13.6 G/DL
LDH SERPL-CCNC: 204 U/L
MCHC RBC-ENTMCNC: 31.1 PG
MCHC RBC-ENTMCNC: 32.1 G/DL
MCV RBC AUTO: 96.8 FL
PLATELET # BLD AUTO: 335 K/UL
RBC # BLD: 4.38 M/UL
RBC # FLD: 13.7 %
WBC # FLD AUTO: 8.5 K/UL

## 2025-05-01 LAB
TESTOST FREE SERPL-MCNC: 0.6 PG/ML
TESTOST SERPL-MCNC: 13.8 NG/DL

## 2025-05-05 LAB
A VAGINAE DNA VAG QL NAA+PROBE: NORMAL
BVAB2 DNA VAG QL NAA+PROBE: NORMAL
C KRUSEI DNA VAG QL NAA+PROBE: NEGATIVE
C TRACH RRNA SPEC QL NAA+PROBE: NEGATIVE
CANDIDA DNA VAG QL NAA+PROBE: NEGATIVE
INHIBIN A SERPL-MCNC: 1.5 PG/ML
MEGA1 DNA VAG QL NAA+PROBE: NORMAL
N GONORRHOEA RRNA SPEC QL NAA+PROBE: NEGATIVE
T VAGINALIS RRNA SPEC QL NAA+PROBE: NEGATIVE

## 2025-05-08 LAB — INHIBIN B: <7 PG/ML

## 2025-05-09 ENCOUNTER — APPOINTMENT (OUTPATIENT)
Dept: OBGYN | Facility: CLINIC | Age: 60
End: 2025-05-09

## 2025-05-22 NOTE — ED PROVIDER NOTE - RADIATION
Patient calling with following questions:    Provider: Marcin Bauer MD    Patient having the following symptoms/issues: Patient called stating that she is interested in getting an injection in her back. Please advise.    Please call patient at the following number:  Mobile [phone]     Patient states that it is okay to leave a detailed message.    Patient was informed that the patient would receive a phone call back within 2 business days, unless this request is STAT.    Preferred times to return call: anytime  
Patient was contacted and scheduled for next available fuv  
She would need to schedule a fuv. Dr. Bauer will review MRI and all options.   
thigh